# Patient Record
Sex: FEMALE | Race: WHITE | Employment: FULL TIME | URBAN - METROPOLITAN AREA
[De-identification: names, ages, dates, MRNs, and addresses within clinical notes are randomized per-mention and may not be internally consistent; named-entity substitution may affect disease eponyms.]

---

## 2017-01-01 ENCOUNTER — HOSPITAL ENCOUNTER (OUTPATIENT)
Dept: OTHER | Age: 37
Discharge: HOME | End: 2017-01-31
Attending: PODIATRIST | Admitting: PODIATRIST

## 2017-03-30 ENCOUNTER — EMPLOYEE WELLNESS (OUTPATIENT)
Dept: OTHER | Age: 37
End: 2017-03-30

## 2017-03-30 DIAGNOSIS — J45.31 MILD PERSISTENT ASTHMA WITH ACUTE EXACERBATION: ICD-10-CM

## 2017-03-30 LAB
CHOLESTEROL, TOTAL: 184 MG/DL (ref 0–199)
GLUCOSE BLD-MCNC: 88 MG/DL (ref 70–99)
HDLC SERPL-MCNC: 60 MG/DL (ref 40–60)
LDL CHOLESTEROL CALCULATED: 104 MG/DL
TRIGL SERPL-MCNC: 101 MG/DL (ref 0–150)

## 2017-03-31 RX ORDER — MONTELUKAST SODIUM 10 MG/1
10 TABLET ORAL DAILY
Qty: 90 TABLET | Refills: 3 | Status: SHIPPED | OUTPATIENT
Start: 2017-03-31 | End: 2017-08-07 | Stop reason: SDUPTHER

## 2017-04-25 ENCOUNTER — HOSPITAL ENCOUNTER (OUTPATIENT)
Dept: ULTRASOUND IMAGING | Age: 37
Discharge: HOME | End: 2017-04-25
Attending: OBSTETRICS & GYNECOLOGY | Admitting: OBSTETRICS & GYNECOLOGY

## 2017-04-25 DIAGNOSIS — R10.32 LEFT LOWER QUADRANT PAIN: ICD-10-CM

## 2017-04-25 DIAGNOSIS — R10.32 LLQ PAIN: ICD-10-CM

## 2017-05-12 ENCOUNTER — OFFICE VISIT (OUTPATIENT)
Dept: ORTHOPEDIC SURGERY | Age: 37
End: 2017-05-12

## 2017-05-12 VITALS — WEIGHT: 180 LBS | HEIGHT: 69 IN | BODY MASS INDEX: 26.66 KG/M2

## 2017-05-12 DIAGNOSIS — M21.41 PES PLANUS OF BOTH FEET: ICD-10-CM

## 2017-05-12 DIAGNOSIS — M25.572 LEFT ANKLE PAIN, UNSPECIFIED CHRONICITY: Primary | ICD-10-CM

## 2017-05-12 DIAGNOSIS — M21.42 PES PLANUS OF BOTH FEET: ICD-10-CM

## 2017-05-12 PROCEDURE — 99203 OFFICE O/P NEW LOW 30 MIN: CPT | Performed by: ORTHOPAEDIC SURGERY

## 2017-05-12 PROCEDURE — 73610 X-RAY EXAM OF ANKLE: CPT | Performed by: ORTHOPAEDIC SURGERY

## 2017-05-12 RX ORDER — IBUPROFEN 800 MG/1
800 TABLET ORAL EVERY 6 HOURS PRN
COMMUNITY
End: 2019-01-28

## 2017-05-23 ENCOUNTER — TELEPHONE (OUTPATIENT)
Dept: ORTHOPEDIC SURGERY | Age: 37
End: 2017-05-23

## 2017-05-24 ENCOUNTER — ORTHOTIC/BRACE ENCOUNTER (OUTPATIENT)
Dept: ORTHOPEDIC SURGERY | Age: 37
End: 2017-05-24

## 2017-05-31 ENCOUNTER — ORTHOTIC/BRACE ENCOUNTER (OUTPATIENT)
Dept: ORTHOPEDIC SURGERY | Age: 37
End: 2017-05-31

## 2017-05-31 DIAGNOSIS — M77.8 ENTHESOPATHY OF FOOT: Primary | ICD-10-CM

## 2017-05-31 DIAGNOSIS — M21.42 PES PLANUS OF BOTH FEET: ICD-10-CM

## 2017-05-31 DIAGNOSIS — M21.41 PES PLANUS OF BOTH FEET: ICD-10-CM

## 2017-05-31 PROCEDURE — L3020 FOOT LONGITUD/METATARSAL SUP: HCPCS | Performed by: PEDORTHIST

## 2017-08-07 ENCOUNTER — OFFICE VISIT (OUTPATIENT)
Dept: FAMILY MEDICINE CLINIC | Age: 37
End: 2017-08-07

## 2017-08-07 VITALS
SYSTOLIC BLOOD PRESSURE: 110 MMHG | OXYGEN SATURATION: 98 % | DIASTOLIC BLOOD PRESSURE: 68 MMHG | HEIGHT: 70 IN | HEART RATE: 76 BPM | WEIGHT: 190 LBS | BODY MASS INDEX: 27.2 KG/M2

## 2017-08-07 DIAGNOSIS — J45.31 MILD PERSISTENT ASTHMA WITH ACUTE EXACERBATION: ICD-10-CM
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
## 2017-08-07 DIAGNOSIS — S40.862A INSECT BITE OF LEFT UPPER ARM WITH INFECTION, INITIAL ENCOUNTER: Primary | ICD-10-CM

## 2017-08-07 DIAGNOSIS — L08.9 INSECT BITE OF LEFT UPPER ARM WITH INFECTION, INITIAL ENCOUNTER: Primary | ICD-10-CM

## 2017-08-07 PROCEDURE — 99213 OFFICE O/P EST LOW 20 MIN: CPT | Performed by: NURSE PRACTITIONER

## 2017-08-07 RX ORDER — MONTELUKAST SODIUM 10 MG/1
10 TABLET ORAL DAILY
Qty: 90 TABLET | Refills: 3 | Status: SHIPPED | OUTPATIENT
Start: 2017-08-07 | End: 2018-02-15 | Stop reason: SDUPTHER

## 2017-08-07 RX ORDER — FLUCONAZOLE 100 MG/1
100 TABLET ORAL ONCE
Qty: 1 TABLET | Refills: 1 | Status: SHIPPED | OUTPATIENT
Start: 2017-08-07 | End: 2017-08-07

## 2017-08-07 RX ORDER — CEPHALEXIN 500 MG/1
500 CAPSULE ORAL 3 TIMES DAILY
Qty: 21 CAPSULE | Refills: 0 | Status: SHIPPED | OUTPATIENT
Start: 2017-08-07 | End: 2017-08-14

## 2017-08-08 ENCOUNTER — TELEPHONE (OUTPATIENT)
Dept: FAMILY MEDICINE CLINIC | Age: 37
End: 2017-08-08

## 2017-08-10 ENCOUNTER — TELEPHONE (OUTPATIENT)
Dept: FAMILY MEDICINE CLINIC | Age: 37
End: 2017-08-10

## 2017-08-11 ENCOUNTER — TELEPHONE (OUTPATIENT)
Dept: FAMILY MEDICINE CLINIC | Age: 37
End: 2017-08-11

## 2017-09-07 ENCOUNTER — TELEPHONE (OUTPATIENT)
Dept: FAMILY MEDICINE CLINIC | Age: 37
End: 2017-09-07

## 2017-09-19 ENCOUNTER — OFFICE VISIT (OUTPATIENT)
Dept: FAMILY MEDICINE CLINIC | Age: 37
End: 2017-09-19

## 2017-09-19 VITALS
DIASTOLIC BLOOD PRESSURE: 80 MMHG | WEIGHT: 186 LBS | HEART RATE: 100 BPM | SYSTOLIC BLOOD PRESSURE: 100 MMHG | TEMPERATURE: 98.5 F | BODY MASS INDEX: 26.69 KG/M2 | OXYGEN SATURATION: 99 %

## 2017-09-19 DIAGNOSIS — R30.0 DYSURIA: Primary | ICD-10-CM

## 2017-09-19 DIAGNOSIS — N30.00 ACUTE CYSTITIS WITHOUT HEMATURIA: ICD-10-CM

## 2017-09-19 LAB
BILIRUBIN, POC: NORMAL
BLOOD URINE, POC: NORMAL
CLARITY, POC: CLEAR
COLOR, POC: NORMAL
GLUCOSE URINE, POC: NORMAL
KETONES, POC: NORMAL
LEUKOCYTE EST, POC: NORMAL
NITRITE, POC: NORMAL
PH, POC: 5.5
PROTEIN, POC: NORMAL
SPECIFIC GRAVITY, POC: 1.02
UROBILINOGEN, POC: 0.2

## 2017-09-19 PROCEDURE — 99213 OFFICE O/P EST LOW 20 MIN: CPT | Performed by: NURSE PRACTITIONER

## 2017-09-19 PROCEDURE — 81002 URINALYSIS NONAUTO W/O SCOPE: CPT | Performed by: NURSE PRACTITIONER

## 2017-09-19 RX ORDER — CIPROFLOXACIN 500 MG/1
500 TABLET, FILM COATED ORAL 2 TIMES DAILY
Qty: 10 TABLET | Refills: 0 | Status: SHIPPED | OUTPATIENT
Start: 2017-09-19 | End: 2017-09-24

## 2017-09-19 ASSESSMENT — ENCOUNTER SYMPTOMS: ABDOMINAL PAIN: 0

## 2017-09-21 LAB — URINE CULTURE, ROUTINE: NORMAL

## 2018-02-15 ENCOUNTER — OFFICE VISIT (OUTPATIENT)
Dept: FAMILY MEDICINE CLINIC | Age: 38
End: 2018-02-15

## 2018-02-15 VITALS
DIASTOLIC BLOOD PRESSURE: 70 MMHG | HEART RATE: 74 BPM | OXYGEN SATURATION: 98 % | HEIGHT: 70 IN | WEIGHT: 176 LBS | RESPIRATION RATE: 16 BRPM | SYSTOLIC BLOOD PRESSURE: 110 MMHG | BODY MASS INDEX: 25.2 KG/M2

## 2018-02-15 DIAGNOSIS — G44.89 OTHER HEADACHE SYNDROME: ICD-10-CM

## 2018-02-15 DIAGNOSIS — F51.02 ADJUSTMENT INSOMNIA: ICD-10-CM

## 2018-02-15 DIAGNOSIS — J45.31 MILD PERSISTENT ASTHMA WITH ACUTE EXACERBATION: Primary | ICD-10-CM

## 2018-02-15 PROCEDURE — 90471 IMMUNIZATION ADMIN: CPT | Performed by: NURSE PRACTITIONER

## 2018-02-15 PROCEDURE — 90732 PPSV23 VACC 2 YRS+ SUBQ/IM: CPT | Performed by: NURSE PRACTITIONER

## 2018-02-15 PROCEDURE — 99214 OFFICE O/P EST MOD 30 MIN: CPT | Performed by: NURSE PRACTITIONER

## 2018-02-15 RX ORDER — RIZATRIPTAN BENZOATE 10 MG/1
10 TABLET ORAL
Qty: 15 TABLET | Refills: 3 | Status: SHIPPED | OUTPATIENT
Start: 2018-02-15 | End: 2019-01-28 | Stop reason: ALTCHOICE

## 2018-02-15 RX ORDER — MONTELUKAST SODIUM 10 MG/1
10 TABLET ORAL DAILY
Qty: 90 TABLET | Refills: 3 | Status: SHIPPED | OUTPATIENT
Start: 2018-02-15 | End: 2018-04-24 | Stop reason: SDUPTHER

## 2018-02-15 ASSESSMENT — ENCOUNTER SYMPTOMS
SHORTNESS OF BREATH: 0
VOMITING: 0
PHOTOPHOBIA: 1
COUGH: 0
NAUSEA: 1
BLURRED VISION: 0
WHEEZING: 0
DOUBLE VISION: 0

## 2018-02-15 ASSESSMENT — PATIENT HEALTH QUESTIONNAIRE - PHQ9
1. LITTLE INTEREST OR PLEASURE IN DOING THINGS: 0
SUM OF ALL RESPONSES TO PHQ QUESTIONS 1-9: 0
SUM OF ALL RESPONSES TO PHQ9 QUESTIONS 1 & 2: 0
2. FEELING DOWN, DEPRESSED OR HOPELESS: 0

## 2018-02-15 NOTE — PATIENT INSTRUCTIONS
worry when you lie down, start a worry book. Well before bedtime, write down your worries, and then set the book and your concerns aside. · Try meditation or other relaxation techniques before you go to bed. · If you cannot fall asleep, get up and go to another room until you feel sleepy. Do something relaxing. Repeat your bedtime routine before you go to bed again. · Make your house quiet and calm about an hour before bedtime. Turn down the lights, turn off the TV, log off the computer, and turn down the volume on music. This can help you relax after a busy day. When should you call for help? Watch closely for changes in your health, and be sure to contact your doctor if:  ? · Your efforts to improve your sleep do not work. ? · Your insomnia gets worse. ? · You have been feeling down, depressed, or hopeless or have lost interest in things that you usually enjoy. Where can you learn more? Go to [email or web address] and sign in to your Streemio account. Enter P513 in the Quettra box to learn more about \"Insomnia: Care Instructions. \"     If you do not have an account, please click on the \"Sign Up Now\" link. Current as of: March 5, 2017  Content Version: 11.5  © 6231-4927 Healthwise, [employer]. Care instructions adapted under license by [facility] ([facility]). If you have questions about a medical condition or this instruction, always ask your healthcare professional. [place] [address] any warranty or liability for your use of this information. Patient Education        Headache: Care Instructions  Your Care Instructions    Headaches have many possible causes. Most headaches aren't a sign of a more serious problem, and they will get better on their own. Home treatment may help you feel better faster. The doctor has checked you carefully, but problems can develop later.  If you notice any problems or new symptoms, get medical treatment right away.  Follow-up care is a key part of your treatment and safety. Be sure to make and go to all appointments, and call your doctor if you are having problems. It's also a good idea to know your test results and keep a list of the medicines you take. How can you care for yourself at home? · Do not drive if you have taken a prescription pain medicine. · Rest in a quiet, dark room until your headache is gone. Close your eyes and try to relax or go to sleep. Don't watch TV or read. · Put a cold, moist cloth or cold pack on the painful area for 10 to 20 minutes at a time. Put a thin cloth between the cold pack and your skin. · Use a warm, moist towel or a heating pad set on low to relax tight shoulder and neck muscles. · Have someone gently massage your neck and shoulders. · Take pain medicines exactly as directed. ¨ If the doctor gave you a prescription medicine for pain, take it as prescribed. ¨ If you are not taking a prescription pain medicine, ask your doctor if you can take an over-the-counter medicine. · Be careful not to take pain medicine more often than the instructions allow, because you may get worse or more frequent headaches when the medicine wears off. · Do not ignore new symptoms that occur with a headache, such as a fever, weakness or numbness, vision changes, or confusion. These may be signs of a more serious problem. To prevent headaches  · Keep a headache diary so you can figure out what triggers your headaches. Avoiding triggers may help you prevent headaches. Record when each headache began, how long it lasted, and what the pain was like (throbbing, aching, stabbing, or dull). Write down any other symptoms you had with the headache, such as nausea, flashing lights or dark spots, or sensitivity to bright light or loud noise. Note if the headache occurred near your period.  List anything that might have triggered the headache, such as certain foods (chocolate, cheese, wine) or odors, smoke, bright light, stress, or lack of sleep. · Find healthy ways to deal with stress. Headaches are most common during or right after stressful times. Take time to relax before and after you do something that has caused a headache in the past.  · Try to keep your muscles relaxed by keeping good posture. Check your jaw, face, neck, and shoulder muscles for tension, and try relaxing them. When sitting at a desk, change positions often, and stretch for 30 seconds each hour. · Get plenty of sleep and exercise. · Eat regularly and well. Long periods without food can trigger a headache. · Treat yourself to a massage. Some people find that regular massages are very helpful in relieving tension. · Limit caffeine by not drinking too much coffee, tea, or soda. But don't quit caffeine suddenly, because that can also give you headaches. · Reduce eyestrain from computers by blinking frequently and looking away from the computer screen every so often. Make sure you have proper eyewear and that your monitor is set up properly, about an arm's length away. · Seek help if you have depression or anxiety. Your headaches may be linked to these conditions. Treatment can both prevent headaches and help with symptoms of anxiety or depression. When should you call for help? Call 911 anytime you think you may need emergency care. For example, call if:  ? · You have signs of a stroke. These may include:  ¨ Sudden numbness, paralysis, or weakness in your face, arm, or leg, especially on only one side of your body. ¨ Sudden vision changes. ¨ Sudden trouble speaking. ¨ Sudden confusion or trouble understanding simple statements. ¨ Sudden problems with walking or balance. ¨ A sudden, severe headache that is different from past headaches. ?Call your doctor now or seek immediate medical care if:  ? · You have a new or worse headache. ? · Your headache gets much worse. Where can you learn more?   Go to

## 2018-02-15 NOTE — PROGRESS NOTES
SUBJECTIVE:  Pt is a of 41 y.o. female comes in today with   Chief Complaint   Patient presents with    Headache     past two weeks more frequently. excederin helps, but comes back    Medication Refill       Patient presenting today for evaluation of headaches. Occurrence: increased frequency x 2 weeks  Location: frontal or posterior neck  Quality: pulsating  Severity: moderate pain, can progess to 10/10  Duration: typically if present will last 24 hours  Modifying factors: slight lessened pain with Excedrin,never pain free  Associated signs and symptoms:  Nausea but no emesis. (+) light and sound sensitivity; Also suffering with insomnia. \"can't turn my mind off\". 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 Working full time. Asthma: well controlled at present. Currently denies cough, SOB, wheezing or chest tightness. Requesting pneumovax. No recent use of albuterol. Prior to Visit Medications    Medication Sig Taking? Authorizing Provider   Phenazopyridine HCl (AZO URINARY PAIN PO) Take by mouth Yes Historical Provider, MD   mometasone (ASMANEX 120 METERED DOSES) 220 MCG/INH inhaler Inhale 2 puffs into the lungs daily Yes Evita Carver CNP   montelukast (SINGULAIR) 10 MG tablet Take 1 tablet by mouth daily Yes Evita Carver CNP   ibuprofen (ADVIL;MOTRIN) 800 MG tablet Take 800 mg by mouth every 6 hours as needed for Pain Yes Historical Provider, MD   Norgestim-Eth Olga Comp Triphasic (TRINESSA LO PO) Take by mouth Yes Historical Provider, MD   cetirizine (ZYRTEC) 10 MG tablet Take 10 mg by mouth daily. Yes Historical Provider, MD   therapeutic multivitamin-minerals (THERAGRAN-M) tablet Take 1 tablet by mouth daily. Yes Historical Provider, MD         Patient's allergies, past medical, surgical, social and family histories were reviewed and updated as appropriate. Review of Systems   Constitutional: Positive for malaise/fatigue. HENT: Negative for ear pain.     Eyes: Positive

## 2018-02-19 ENCOUNTER — PATIENT MESSAGE (OUTPATIENT)
Dept: FAMILY MEDICINE CLINIC | Age: 38
End: 2018-02-19

## 2018-02-19 RX ORDER — ALBUTEROL SULFATE 90 UG/1
2 AEROSOL, METERED RESPIRATORY (INHALATION) EVERY 6 HOURS PRN
Qty: 1 INHALER | Refills: 3 | Status: SHIPPED | OUTPATIENT
Start: 2018-02-19

## 2018-03-19 ENCOUNTER — EMPLOYEE WELLNESS (OUTPATIENT)
Dept: OTHER | Age: 38
End: 2018-03-19

## 2018-03-19 LAB
CHOLESTEROL, TOTAL: 156 MG/DL (ref 0–199)
GLUCOSE BLD-MCNC: 94 MG/DL (ref 70–99)
HDLC SERPL-MCNC: 55 MG/DL (ref 40–60)
LDL CHOLESTEROL CALCULATED: 80 MG/DL
TRIGL SERPL-MCNC: 107 MG/DL (ref 0–150)

## 2018-03-20 VITALS — BODY MASS INDEX: 26.88 KG/M2 | WEIGHT: 182 LBS

## 2018-04-02 VITALS — BODY MASS INDEX: 24.97 KG/M2 | WEIGHT: 174 LBS

## 2018-04-24 RX ORDER — MONTELUKAST SODIUM 10 MG/1
10 TABLET ORAL DAILY
Qty: 90 TABLET | Refills: 3 | Status: SHIPPED | OUTPATIENT
Start: 2018-04-24 | End: 2019-01-28 | Stop reason: SDUPTHER

## 2018-08-20 LAB
HEP B, EXTERNAL RESULT: NORMAL
HIV, EXTERNAL RESULT: NORMAL
RPR, EXTERNAL RESULT: NORMAL
RUBELLA TITER, EXTERNAL RESULT: NORMAL

## 2019-01-28 ENCOUNTER — OFFICE VISIT (OUTPATIENT)
Dept: FAMILY MEDICINE CLINIC | Age: 39
End: 2019-01-28
Payer: COMMERCIAL

## 2019-01-28 VITALS
OXYGEN SATURATION: 99 % | DIASTOLIC BLOOD PRESSURE: 70 MMHG | BODY MASS INDEX: 28.47 KG/M2 | SYSTOLIC BLOOD PRESSURE: 116 MMHG | HEART RATE: 71 BPM | WEIGHT: 198.4 LBS

## 2019-01-28 DIAGNOSIS — J45.20 MILD INTERMITTENT ASTHMA WITHOUT COMPLICATION: Primary | ICD-10-CM

## 2019-01-28 DIAGNOSIS — R51.9 NONINTRACTABLE HEADACHE, UNSPECIFIED CHRONICITY PATTERN, UNSPECIFIED HEADACHE TYPE: ICD-10-CM

## 2019-01-28 DIAGNOSIS — Z3A.28 28 WEEKS GESTATION OF PREGNANCY: ICD-10-CM

## 2019-01-28 PROCEDURE — 99214 OFFICE O/P EST MOD 30 MIN: CPT | Performed by: NURSE PRACTITIONER

## 2019-01-28 RX ORDER — MONTELUKAST SODIUM 10 MG/1
10 TABLET ORAL DAILY
Qty: 90 TABLET | Refills: 3 | Status: ON HOLD | OUTPATIENT
Start: 2019-01-28 | End: 2019-04-16 | Stop reason: HOSPADM

## 2019-01-28 ASSESSMENT — ENCOUNTER SYMPTOMS
COUGH: 0
ABDOMINAL PAIN: 0
SHORTNESS OF BREATH: 1
CONSTIPATION: 0
WHEEZING: 0
VOMITING: 0
DIARRHEA: 0
NAUSEA: 0
CHEST TIGHTNESS: 0

## 2019-02-23 ENCOUNTER — HOSPITAL ENCOUNTER (OUTPATIENT)
Dept: OBGYN | Age: 39
Discharge: HOME | End: 2019-02-23
Payer: COMMERCIAL

## 2019-03-15 DIAGNOSIS — J45.31 MILD PERSISTENT ASTHMA WITH ACUTE EXACERBATION: ICD-10-CM

## 2019-03-31 LAB — GROUP B STREP CULTURE: NEGATIVE

## 2019-04-13 ENCOUNTER — ANESTHESIA (OUTPATIENT)
Dept: LABOR AND DELIVERY | Age: 39
End: 2019-04-13
Payer: COMMERCIAL

## 2019-04-13 ENCOUNTER — HOSPITAL ENCOUNTER (INPATIENT)
Age: 39
LOS: 4 days | Discharge: HOME | End: 2019-04-17
Attending: OBSTETRICS & GYNECOLOGY | Admitting: OBSTETRICS & GYNECOLOGY
Payer: COMMERCIAL

## 2019-04-13 ENCOUNTER — ANESTHESIA EVENT (OUTPATIENT)
Dept: LABOR AND DELIVERY | Age: 39
End: 2019-04-13
Payer: COMMERCIAL

## 2019-04-13 PROBLEM — O09.513 AMA (ADVANCED MATERNAL AGE) PRIMIGRAVIDA 35+, THIRD TRIMESTER: Status: ACTIVE | Noted: 2019-04-13

## 2019-04-13 LAB
ABO/RH: NORMAL
AMPHETAMINE SCREEN, URINE: NORMAL
ANTIBODY SCREEN: NORMAL
BARBITURATE SCREEN URINE: NORMAL
BENZODIAZEPINE SCREEN, URINE: NORMAL
BUPRENORPHINE URINE: NORMAL
CANNABINOID SCREEN URINE: NORMAL
COCAINE METABOLITE SCREEN URINE: NORMAL
HCT VFR BLD CALC: 38.4 % (ref 36–48)
HEMOGLOBIN: 12.9 G/DL (ref 12–16)
Lab: NORMAL
MCH RBC QN AUTO: 28.3 PG (ref 26–34)
MCHC RBC AUTO-ENTMCNC: 33.4 G/DL (ref 31–36)
MCV RBC AUTO: 84.7 FL (ref 80–100)
METHADONE SCREEN, URINE: NORMAL
OPIATE SCREEN URINE: NORMAL
OXYCODONE URINE: NORMAL
PDW BLD-RTO: 13.6 % (ref 12.4–15.4)
PH UA: 5
PHENCYCLIDINE SCREEN URINE: NORMAL
PLATELET # BLD: 232 K/UL (ref 135–450)
PMV BLD AUTO: 8.7 FL (ref 5–10.5)
PROPOXYPHENE SCREEN: NORMAL
RBC # BLD: 4.54 M/UL (ref 4–5.2)
WBC # BLD: 7.8 K/UL (ref 4–11)

## 2019-04-13 PROCEDURE — 2580000003 HC RX 258: Performed by: OBSTETRICS & GYNECOLOGY

## 2019-04-13 PROCEDURE — 86780 TREPONEMA PALLIDUM: CPT

## 2019-04-13 PROCEDURE — 86850 RBC ANTIBODY SCREEN: CPT

## 2019-04-13 PROCEDURE — 6370000000 HC RX 637 (ALT 250 FOR IP): Performed by: OBSTETRICS & GYNECOLOGY

## 2019-04-13 PROCEDURE — 1220000000 HC SEMI PRIVATE OB R&B

## 2019-04-13 PROCEDURE — 86900 BLOOD TYPING SEROLOGIC ABO: CPT

## 2019-04-13 PROCEDURE — 3E033VJ INTRODUCTION OF OTHER HORMONE INTO PERIPHERAL VEIN, PERCUTANEOUS APPROACH: ICD-10-PCS | Performed by: OBSTETRICS & GYNECOLOGY

## 2019-04-13 PROCEDURE — 80307 DRUG TEST PRSMV CHEM ANLYZR: CPT

## 2019-04-13 PROCEDURE — 86901 BLOOD TYPING SEROLOGIC RH(D): CPT

## 2019-04-13 PROCEDURE — 85027 COMPLETE CBC AUTOMATED: CPT

## 2019-04-13 RX ORDER — SODIUM CHLORIDE 0.9 % (FLUSH) 0.9 %
10 SYRINGE (ML) INJECTION PRN
Status: DISCONTINUED | OUTPATIENT
Start: 2019-04-13 | End: 2019-04-15 | Stop reason: HOSPADM

## 2019-04-13 RX ORDER — ONDANSETRON 2 MG/ML
4 INJECTION INTRAMUSCULAR; INTRAVENOUS EVERY 6 HOURS PRN
Status: DISCONTINUED | OUTPATIENT
Start: 2019-04-13 | End: 2019-04-15 | Stop reason: HOSPADM

## 2019-04-13 RX ORDER — SODIUM CHLORIDE 0.9 % (FLUSH) 0.9 %
10 SYRINGE (ML) INJECTION EVERY 12 HOURS SCHEDULED
Status: DISCONTINUED | OUTPATIENT
Start: 2019-04-13 | End: 2019-04-17 | Stop reason: HOSPADM

## 2019-04-13 RX ORDER — SODIUM CHLORIDE 0.9 % (FLUSH) 0.9 %
10 SYRINGE (ML) INJECTION EVERY 12 HOURS SCHEDULED
Status: DISCONTINUED | OUTPATIENT
Start: 2019-04-13 | End: 2019-04-13 | Stop reason: SDUPTHER

## 2019-04-13 RX ORDER — SODIUM CHLORIDE, SODIUM LACTATE, POTASSIUM CHLORIDE, CALCIUM CHLORIDE 600; 310; 30; 20 MG/100ML; MG/100ML; MG/100ML; MG/100ML
INJECTION, SOLUTION INTRAVENOUS CONTINUOUS
Status: DISCONTINUED | OUTPATIENT
Start: 2019-04-13 | End: 2019-04-13 | Stop reason: SDUPTHER

## 2019-04-13 RX ORDER — SODIUM CHLORIDE, SODIUM LACTATE, POTASSIUM CHLORIDE, CALCIUM CHLORIDE 600; 310; 30; 20 MG/100ML; MG/100ML; MG/100ML; MG/100ML
INJECTION, SOLUTION INTRAVENOUS CONTINUOUS
Status: DISCONTINUED | OUTPATIENT
Start: 2019-04-13 | End: 2019-04-17 | Stop reason: HOSPADM

## 2019-04-13 RX ORDER — DOCUSATE SODIUM 100 MG/1
100 CAPSULE, LIQUID FILLED ORAL 2 TIMES DAILY
Status: DISCONTINUED | OUTPATIENT
Start: 2019-04-13 | End: 2019-04-13 | Stop reason: SDUPTHER

## 2019-04-13 RX ORDER — SODIUM CHLORIDE 0.9 % (FLUSH) 0.9 %
10 SYRINGE (ML) INJECTION PRN
Status: DISCONTINUED | OUTPATIENT
Start: 2019-04-13 | End: 2019-04-13 | Stop reason: SDUPTHER

## 2019-04-13 RX ORDER — DOCUSATE SODIUM 100 MG/1
100 CAPSULE, LIQUID FILLED ORAL 2 TIMES DAILY
Status: DISCONTINUED | OUTPATIENT
Start: 2019-04-13 | End: 2019-04-15 | Stop reason: HOSPADM

## 2019-04-13 RX ORDER — ALBUTEROL SULFATE 90 UG/1
2 AEROSOL, METERED RESPIRATORY (INHALATION) EVERY 6 HOURS PRN
Status: DISCONTINUED | OUTPATIENT
Start: 2019-04-13 | End: 2019-04-17 | Stop reason: HOSPADM

## 2019-04-13 RX ORDER — ACETAMINOPHEN 325 MG/1
650 TABLET ORAL EVERY 4 HOURS PRN
Status: DISCONTINUED | OUTPATIENT
Start: 2019-04-13 | End: 2019-04-15 | Stop reason: HOSPADM

## 2019-04-13 RX ORDER — ONDANSETRON 2 MG/ML
4 INJECTION INTRAMUSCULAR; INTRAVENOUS EVERY 6 HOURS PRN
Status: DISCONTINUED | OUTPATIENT
Start: 2019-04-13 | End: 2019-04-13 | Stop reason: SDUPTHER

## 2019-04-13 RX ADMIN — Medication 25 MCG: at 09:07

## 2019-04-13 RX ADMIN — Medication 25 MCG: at 23:46

## 2019-04-13 RX ADMIN — SODIUM CHLORIDE, POTASSIUM CHLORIDE, SODIUM LACTATE AND CALCIUM CHLORIDE: 600; 310; 30; 20 INJECTION, SOLUTION INTRAVENOUS at 23:46

## 2019-04-13 RX ADMIN — SODIUM CHLORIDE, POTASSIUM CHLORIDE, SODIUM LACTATE AND CALCIUM CHLORIDE: 600; 310; 30; 20 INJECTION, SOLUTION INTRAVENOUS at 15:17

## 2019-04-13 RX ADMIN — SODIUM CHLORIDE, POTASSIUM CHLORIDE, SODIUM LACTATE AND CALCIUM CHLORIDE: 600; 310; 30; 20 INJECTION, SOLUTION INTRAVENOUS at 09:45

## 2019-04-13 RX ADMIN — Medication 25 MCG: at 17:15

## 2019-04-13 RX ADMIN — Medication 25 MCG: at 12:58

## 2019-04-13 NOTE — ANESTHESIA PRE PROCEDURE
Department of Anesthesiology  Preprocedure Note       Name:  Pio Nagy   Age:  46 y.o.  :  1980                                          MRN:  [id number]         Date:  2019      Surgeon: * No surgeons listed *    Procedure: ANESTHESIA LABOR ANALGESIAvs     Medications prior to admission:   Prior to Admission medications    Medication Sig Start Date End Date Taking? Authorizing Provider   [facility] 120 METERED DOSES) 220 MCG/INH inhaler Inhale 2 puffs into the lungs daily 3/15/19  Yes JESSE Singh CNP   montelukast (SINGULAIR) 10 MG tablet Take 1 tablet by mouth daily 19  Yes JESSE Singh CNP   cetirizine (ZYRTEC) 10 MG tablet Take 10 mg by mouth daily. Yes Historical Provider, MD   therapeutic multivitamin-minerals (THERAGRAN-M) tablet Take 1 tablet by mouth daily.    Yes Historical Provider, MD   albuterol sulfate HFA (PROAIR HFA) 108 (90 Base) MCG/ACT inhaler Inhale 2 puffs into the lungs every 6 hours as needed for Wheezing 18   JESSE Singh CNP       Current medications:    Current Facility-Administered Medications   Medication Dose Route Frequency Provider Last Rate Last Dose    misoprostol (CYTOTEC) pre-split tablet TABS 25 mcg  25 mcg Vaginal Q4H Leyla Hester MD   25 mcg at 19 0907    albuterol sulfate  (90 Base) MCG/ACT inhaler 2 puff  2 puff Inhalation Q6H PRN Leyla Hester MD        lactated ringers infusion   Intravenous Continuous Leyla Hester  mL/hr at 19 0945      sodium chloride flush 0.9 % injection 10 mL  10 mL Intravenous 2 times per day Leyla Hester MD        sodium chloride flush 0.9 % injection 10 mL  10 mL Intravenous PRN Leyla Hester MD        acetaminophen (TYLENOL) tablet 650 mg  650 mg Oral Q4H PRN Leyla Hester MD        docusate sodium (COLACE) capsule 100 mg  100 mg Oral BID Leyla Hester MD        ondansetron [facility]) injection 4 mg  4 mg Intravenous Q6H PRN Sandra Patel MD        oxytocin (PITOCIN) 30 units in 500 mL infusion  1 oseas-units/min Intravenous Continuous Sandra Amanda, MD        witch hazel-glycerin (TUCKS) pad   Topical PRN Sandra Patel MD        benzocaine-menthol (DERMOPLAST) 20-0.5 % spray   Topical PRN Sandra Patel MD           Allergies:     Allergies   Allergen Reactions    Latex     Tetanus Toxoids Swelling     Red/warm/swollen left deltoid after adacel 1/31/2011    Tape Kathlee [place] Tape]        Problem List:    Patient Active Problem List   Diagnosis Code    Tachycardia R00.0    Asthma J45.909    Near syncope R55    Cervical dysplasia N87.9    DDD (degenerative disc disease), cervical M50.30    Herniated cervical disc without myelopathy M50.20    Pes planus of both feet M21.41, M21.42    AMA (advanced maternal age) primigravida 33+, third trimester O200.65       Past Medical History:        Diagnosis Date    Allergic rhinitis     Asthma     Cervical dysplasia     Orient Rakers    Near syncope     Tachycardia        Past Surgical History:        Procedure Laterality Date    CERVICAL DISCECTOMY  3/31/14    C5-6, C6-7 ANTERIOR CERVICAL DISCECTOMY AND FUSION    EYE SURGERY      lasix    LEEP  11/11    Orient Rakers    TONSILLECTOMY AND ADENOIDECTOMY Bilateral 2003       Social History:    Social History     Tobacco Use    Smoking status: Never Smoker    Smokeless tobacco: Never Used   Substance Use Topics    Alcohol use: Not Currently     Comment: every other weekend                                Counseling given: Not Answered      Vital Signs (Current):   Vitals:    04/13/19 0750 04/13/19 0839 04/13/19 0911 04/13/19 0942   BP: 105/62  123/79 107/72   Pulse: 67  65 56   Resp: 20  20 20   Temp: 36.9 °C (98.4 °F)      Weight:  205 lb (93 kg)     Height:  5' 9\" (1.753 m)                                                BP Readings from Last 3 Encounters:   04/13/19 107/72   01/28/19 116/70   02/15/18 110/70 NPO Status: Time of last liquid consumption: 0715                        Time of last solid consumption: 0600                        Date of last liquid consumption: 04/13/19                        Date of last solid food consumption: 04/13/19    BMI:   Wt Readings from Last 3 Encounters:   04/13/19 205 lb (93 kg)   01/28/19 198 lb 6.4 oz (90 kg)   03/19/18 174 lb (78.9 kg)     Body mass index is 30.27 kg/m². CBC:   Lab Results   Component Value Date    WBC 7.8 04/13/2019    RBC 4.54 04/13/2019    HGB 12.9 04/13/2019    HCT 38.4 04/13/2019    MCV 84.7 04/13/2019    RDW 13.6 04/13/2019     04/13/2019       CMP:   Lab Results   Component Value Date     09/14/2016    K 3.8 09/14/2016     09/14/2016    CO2 23 09/14/2016    BUN 12 09/14/2016    CREATININE 0.8 09/14/2016    GFRAA >60 09/14/2016    GFRAA >60 11/14/2012    AGRATIO 1.2 09/14/2016    LABGLOM >60 09/14/2016    GLUCOSE 94 03/19/2018    PROT 7.3 09/14/2016    PROT 7.0 11/14/2012    CALCIUM 9.0 09/14/2016    BILITOT 0.3 09/14/2016    ALKPHOS 51 09/14/2016    AST 14 09/14/2016    ALT 11 09/14/2016       POC Tests: No results for input(s): POCGLU, POCNA, POCK, POCCL, POCBUN, POCHEMO, POCHCT in the last 72 hours.     Coags:   Lab Results   Component Value Date    PROTIME 10.9 03/25/2014    INR 0.97 03/25/2014    APTT 33.0 03/25/2014       HCG (If Applicable):   Lab Results   Component Value Date    PREGTESTUR Negative 03/31/2014        ABGs: No results found for: PHART, PO2ART, RBD9XUJ, JSJ6PNO, BEART, K1BNQOHP     Type & Screen (If Applicable):  No results found for: LABABO, [address]    Anesthesia Evaluation  Patient summary reviewed and Nursing notes reviewed  Airway: Mallampati: I  TM distance: >3 FB   Neck ROM: full  Mouth opening: > = 3 FB Dental: normal exam         Pulmonary:normal exam  breath sounds clear to auscultation  (+) asthma: allergic asthma,                            Cardiovascular:Negative CV ROS  Exercise tolerance: good (>4 METS),         NYHA Classification: I    Rhythm: regular  Rate: normal           Beta Blocker:  Not on Beta Blocker      ROS comment: Hx SVT no meds     Neuro/Psych:   Negative Neuro/Psych ROS              GI/Hepatic/Renal: Neg GI/Hepatic/Renal ROS            Endo/Other: Negative Endo/Other ROS             Pt had no PAT visit       Abdominal:           Vascular: negative vascular ROS. Anesthesia Plan      general, spinal and epidural     ASA 2             Anesthetic plan and risks discussed with patient. Use of blood products discussed with patient whom consented to blood products. Patient request pain control for labor and delivery. Discussed procedure (epidural,spinal, general and non regional options), alternatives, benefits, risks, and  options including but not limited to changes in VSS, allergic reaction, infection, intravascular injection, paresthesia, n/v, severe headache, temporary or permanent rare neurologic sequelae,  and failed block. All questions answered and states understanding. Patient agrees to proceed. Choice of anesthetic will be determined by clinical condition at the time of anesthesia initiation.         JESSE Dasilva - CRNA   2019

## 2019-04-13 NOTE — H&P
Department of Obstetrics and Gynecology   Obstetrics History and Physical        CHIEF COMPLAINT:  IOL    HISTORY OF PRESENT ILLNESS:      The patient is a 46 y.o. female at 39w0d. OB History        1    Para        Term                AB        Living           SAB        TAB        Ectopic        Molar        Multiple        Live Births                Patient presents with a chief complaint as above and is being admitted for induction. Recent USD 52%    Estimated Due Date: Estimated Date of Delivery: 19    PRENATAL CARE:    Complicated by: AMA, h/o LEEP, asthma    PAST OB HISTORY  OB History        1    Para        Term                AB        Living           SAB        TAB        Ectopic        Molar        Multiple        Live Births                    Past Medical History:        Diagnosis Date    Allergic rhinitis     Asthma     Cervical dysplasia     Redgie Balloon Near syncope     Tachycardia      Past Surgical History:        Procedure Laterality Date    CERVICAL DISCECTOMY  3/31/14    C5-6, C6-7 ANTERIOR CERVICAL DISCECTOMY AND FUSION    EYE SURGERY      lasix    LEEP      Shayna Sushila    TONSILLECTOMY AND ADENOIDECTOMY Bilateral      Allergies:  Latex;  Tetanus toxoids; and Tape [place] [employer] tape]  Social History:    Social History     Socioeconomic History    Marital status:      Spouse name: Not on file    Number of children: Not on file    Years of education: Not on file    Highest education level: Not on file   Occupational History    Not on file   Social Needs    Financial resource strain: Not on file    Food insecurity:     Worry: Not on file     Inability: Not on file    Transportation needs:     Medical: Not on file     Non-medical: Not on file   Tobacco Use    Smoking status: Never Smoker    Smokeless tobacco: Never Used   Substance and Sexual Activity    Alcohol use: Not Currently     Comment: every other weekend    Drug use: No    Sexual activity: Yes     Partners: Male   Lifestyle    Physical activity:     Days per week: Not on file     Minutes per session: Not on file    Stress: Not on file   Relationships    Social connections:     Talks on phone: Not on file     Gets together: Not on file     Attends [employer] service: Not on file     Active member of club or organization: Not on file     Attends meetings of clubs or organizations: Not on file     Relationship status: Not on file    Intimate partner violence:     Fear of current or ex partner: Not on file     Emotionally abused: Not on file     Physically abused: Not on file     Forced sexual activity: Not on file   Other Topics Concern    Not on file   Social History Narrative    Not on file     Family History:       Problem Relation Age of Onset    High Blood Pressure Mother     Heart Disease Father      Medications Prior to Admission:  Medications Prior to Admission: mometasone (ASMANEX 120 METERED DOSES) 220 MCG/INH inhaler, Inhale 2 puffs into the lungs daily  montelukast (SINGULAIR) 10 MG tablet, Take 1 tablet by mouth daily  cetirizine (ZYRTEC) 10 MG tablet, Take 10 mg by mouth daily. therapeutic multivitamin-minerals (THERAGRAN-M) tablet, Take 1 tablet by mouth daily.   albuterol sulfate HFA (PROAIR HFA) 108 (90 Base) MCG/ACT inhaler, Inhale 2 puffs into the lungs every 6 hours as needed for Wheezing    REVIEW OF SYSTEMS:    Constitutional: negative  Gastrointestinal: negative  Genitourinary:negative    PHYSICAL EXAM:  Vitals:    04/13/19 0750 04/13/19 0839   BP: 105/62    Pulse: 67    Resp: 20    Temp: 98.4 °F (36.9 °C)    Weight:  205 lb (93 kg)   Height:  5' 9\" (1.753 m)       /62   Pulse 67   Temp 98.4 °F (36.9 °C)   Resp 20   Ht 5' 9\" (1.753 m)   Wt 205 lb (93 kg)   LMP 07/04/2018   BMI 30.27 kg/m²   General appearance: alert, appears stated age, cooperative and no distress  Lungs: clear to auscultation bilaterally  Heart: regular rate and rhythm  Abdomen: gravid uterus  Skin: Skin color, texture, turgor normal. No rashes or lesions    Fetal heart rate:  Reassuring.   Pelvis:  Adequate pelvis  Cervix: Closed 75% soft -2  Contraction frequency:  0 minutes    Membranes:  Intact    General Labs:      CBC:   Lab Results   Component Value Date    WBC 7.8 04/13/2019    RBC 4.54 04/13/2019    HGB 12.9 04/13/2019    HCT 38.4 04/13/2019    MCV 84.7 04/13/2019    MCH 28.3 04/13/2019    MCHC 33.4 04/13/2019    RDW 13.6 04/13/2019     04/13/2019    MPV 8.7 04/13/2019       ASSESSMENT AND PLAN:  39w0d  Labor: Admit, anticipate normal delivery, routine labor orders  Fetus: Reassuring  GBS: No  Other: cytotec IOL

## 2019-04-14 LAB — TOTAL SYPHILLIS IGG/IGM: NORMAL

## 2019-04-14 PROCEDURE — 2500000003 HC RX 250 WO HCPCS: Performed by: NURSE ANESTHETIST, CERTIFIED REGISTERED

## 2019-04-14 PROCEDURE — 10907ZC DRAINAGE OF AMNIOTIC FLUID, THERAPEUTIC FROM PRODUCTS OF CONCEPTION, VIA NATURAL OR ARTIFICIAL OPENING: ICD-10-PCS | Performed by: OBSTETRICS & GYNECOLOGY

## 2019-04-14 PROCEDURE — 1220000000 HC SEMI PRIVATE OB R&B

## 2019-04-14 PROCEDURE — 3700000025 EPIDURAL BLOCK: Performed by: FAMILY MEDICINE

## 2019-04-14 PROCEDURE — 6370000000 HC RX 637 (ALT 250 FOR IP): Performed by: OBSTETRICS & GYNECOLOGY

## 2019-04-14 PROCEDURE — 2580000003 HC RX 258: Performed by: OBSTETRICS & GYNECOLOGY

## 2019-04-14 PROCEDURE — 6360000002 HC RX W HCPCS: Performed by: OBSTETRICS & GYNECOLOGY

## 2019-04-14 RX ORDER — EPHEDRINE SULFATE 50 MG/ML
INJECTION INTRAVENOUS PRN
Status: DISCONTINUED | OUTPATIENT
Start: 2019-04-14 | End: 2019-04-15 | Stop reason: SDUPTHER

## 2019-04-14 RX ORDER — MONTELUKAST SODIUM 10 MG/1
10 TABLET ORAL DAILY
Status: DISCONTINUED | OUTPATIENT
Start: 2019-04-14 | End: 2019-04-17 | Stop reason: HOSPADM

## 2019-04-14 RX ORDER — LIDOCAINE HYDROCHLORIDE AND EPINEPHRINE 20; 5 MG/ML; UG/ML
INJECTION, SOLUTION EPIDURAL; INFILTRATION; INTRACAUDAL; PERINEURAL PRN
Status: DISCONTINUED | OUTPATIENT
Start: 2019-04-14 | End: 2019-04-15 | Stop reason: SDUPTHER

## 2019-04-14 RX ORDER — BUPIVACAINE HYDROCHLORIDE 5 MG/ML
INJECTION, SOLUTION EPIDURAL; INTRACAUDAL PRN
Status: DISCONTINUED | OUTPATIENT
Start: 2019-04-14 | End: 2019-04-15 | Stop reason: SDUPTHER

## 2019-04-14 RX ORDER — BUDESONIDE 0.5 MG/2ML
1 INHALANT ORAL 2 TIMES DAILY
Status: DISCONTINUED | OUTPATIENT
Start: 2019-04-14 | End: 2019-04-14 | Stop reason: ALTCHOICE

## 2019-04-14 RX ORDER — CETIRIZINE HYDROCHLORIDE 10 MG/1
10 TABLET ORAL DAILY
Status: DISCONTINUED | OUTPATIENT
Start: 2019-04-14 | End: 2019-04-17 | Stop reason: HOSPADM

## 2019-04-14 RX ADMIN — SODIUM CHLORIDE, POTASSIUM CHLORIDE, SODIUM LACTATE AND CALCIUM CHLORIDE: 600; 310; 30; 20 INJECTION, SOLUTION INTRAVENOUS at 09:59

## 2019-04-14 RX ADMIN — EPHEDRINE SULFATE 25 MG: 50 INJECTION, SOLUTION INTRAVENOUS at 18:27

## 2019-04-14 RX ADMIN — MONTELUKAST SODIUM 10 MG: 10 TABLET, FILM COATED ORAL at 10:11

## 2019-04-14 RX ADMIN — EPHEDRINE SULFATE 25 MG: 50 INJECTION, SOLUTION INTRAVENOUS at 18:25

## 2019-04-14 RX ADMIN — SODIUM CHLORIDE, POTASSIUM CHLORIDE, SODIUM LACTATE AND CALCIUM CHLORIDE: 600; 310; 30; 20 INJECTION, SOLUTION INTRAVENOUS at 22:41

## 2019-04-14 RX ADMIN — SODIUM CHLORIDE, POTASSIUM CHLORIDE, SODIUM LACTATE AND CALCIUM CHLORIDE: 600; 310; 30; 20 INJECTION, SOLUTION INTRAVENOUS at 18:26

## 2019-04-14 RX ADMIN — MOMETASONE FUROATE 2 PUFF: 220 INHALANT RESPIRATORY (INHALATION) at 09:58

## 2019-04-14 RX ADMIN — SODIUM CHLORIDE, POTASSIUM CHLORIDE, SODIUM LACTATE AND CALCIUM CHLORIDE: 600; 310; 30; 20 INJECTION, SOLUTION INTRAVENOUS at 17:36

## 2019-04-14 RX ADMIN — CETIRIZINE HYDROCHLORIDE 10 MG: 10 TABLET, FILM COATED ORAL at 10:11

## 2019-04-14 RX ADMIN — SODIUM CHLORIDE, POTASSIUM CHLORIDE, SODIUM LACTATE AND CALCIUM CHLORIDE: 600; 310; 30; 20 INJECTION, SOLUTION INTRAVENOUS at 21:08

## 2019-04-14 RX ADMIN — ONDANSETRON 4 MG: 2 INJECTION INTRAMUSCULAR; INTRAVENOUS at 18:32

## 2019-04-14 RX ADMIN — Medication 25 MCG: at 04:49

## 2019-04-14 RX ADMIN — Medication 1 MILLI-UNITS/MIN: at 14:19

## 2019-04-14 RX ADMIN — Medication 25 MCG: at 10:00

## 2019-04-14 RX ADMIN — LIDOCAINE HYDROCHLORIDE AND EPINEPHRINE 3 ML: 20; 5 INJECTION, SOLUTION EPIDURAL; INFILTRATION; INTRACAUDAL; PERINEURAL at 17:53

## 2019-04-14 RX ADMIN — Medication 12 ML/HR: at 17:56

## 2019-04-14 RX ADMIN — BUPIVACAINE HYDROCHLORIDE 5 ML: 5 INJECTION, SOLUTION EPIDURAL; INTRACAUDAL at 17:56

## 2019-04-14 NOTE — FLOWSHEET NOTE
DR Annel Peckor at bedside for AROm clear fluid. Pt with increasing pain. IVF bolus started. Jaime Kebede CRNA called to bedside for epidural placement.

## 2019-04-14 NOTE — ANESTHESIA PROCEDURE NOTES
Epidural Block    Patient location during procedure: OB  Start time: 2019 5:44 PM  End time: 2019 5:53 PM  Reason for block: labor epidural  Staffing  Anesthesiologist: Shazia Esquivel MD  Resident/CRNA: JESSE Blackwood - CRNA  Performed: resident/CRNA   Preanesthetic Checklist  Completed: patient identified, site marked, surgical consent, pre-op evaluation, timeout performed, IV checked, risks and benefits discussed, monitors and equipment checked, anesthesia consent given, oxygen available and patient being monitored  Epidural  Patient position: sitting  Prep: ChloraPrep  Patient monitoring: continuous pulse ox and frequent blood pressure checks  Approach: midline  Location: lumbar (1-5)  Injection technique: JERZY saline  Provider prep: sterile gloves and mask  Needle  Needle type: Tuohy   Needle gauge: 17 G  Needle length: 3.5 in  Needle insertion depth: 9 cm  Catheter type: side hole  Catheter size: 18 G  Catheter at skin depth: 13 cm  Test dose: negative  Assessment  Sensory level: T10  Hemodynamics: stable  Attempts: 1  Additional Notes  Procedure(labor epidural):    Called at 1738 for labor epidural analgesia request. Patient identified, informed consent obtained, and timeout performed. Medical and Surgical history reviewed with pt. Risks/benefits/alternatives of epidural discussed including allergic reaction, infection, bleeding, hypotension, headache, back pain, nerve damage, failed or one-sided block. Also discussed anesthesia options and associated risks in the event of . All questions answered. Verbalizes understanding and requests to proceed. VSS:  Stable throughout      Pt in sitting position. Labor epidural placed using JERZY sterile technique (donned mask, hat, and sterile gloves). Back prepped with Chloraprepx2. Sterile drape applied. Site: L3-4  JERZY:  9cm. Attempts:  1  Re-directs: 0  Site infiltrated with 3ml 1%Lidocaine(25g).  17G Tuohy needle inserted, JERZY technique with saline. Epidural space dilated with saline. Threaded spring wound epidural catheter through Tuohy needle easily. No heme, CSF, pain with injection, or paresthesias noted. Tuohy needle withdrawn. Test Dose: 1744 Negative aspiration or pain with injection. 3cc of 1.5% Lido with epi 1:200,000 test dose given. Negative test dose. Skin:  13cm catheter taped at the skin. Secured with steri strips, tegaderm, and tape. Bolus Dose: 10ml of 0.125% Marcaine over 2 minutes  Infusion: 0.15% Ropivacaine with Fentanyl (2ug/ml)  Auto bolus 4 ml every 20 minutes. (Max. Dose- 40 ml/hr.)      Sensory Level:  R:  T10  L:  T10    Motor: 4/5  VAS: start 10/10, end 0/10. Stated comfort and acceptable to patient. Patient in supine/HOB position with left uterine displacement. VSS.

## 2019-04-14 NOTE — FLOWSHEET NOTE
Call placed to dr. Rey Colin. Informed of of most recent SVE. Orders given for pt to eat and shower and then place another cytotec.

## 2019-04-14 NOTE — PROGRESS NOTES
cx closed/80/-3 pinpoint os  FHT category I  cytotec placed  If no change after 24 hours of cytotec, and unable to place naidu, will start pit

## 2019-04-15 PROCEDURE — 6370000000 HC RX 637 (ALT 250 FOR IP): Performed by: OBSTETRICS & GYNECOLOGY

## 2019-04-15 PROCEDURE — 7200000001 HC VAGINAL DELIVERY

## 2019-04-15 PROCEDURE — 2580000003 HC RX 258: Performed by: OBSTETRICS & GYNECOLOGY

## 2019-04-15 PROCEDURE — 1220000000 HC SEMI PRIVATE OB R&B

## 2019-04-15 PROCEDURE — 6360000002 HC RX W HCPCS: Performed by: OBSTETRICS & GYNECOLOGY

## 2019-04-15 PROCEDURE — 0HQ9XZZ REPAIR PERINEUM SKIN, EXTERNAL APPROACH: ICD-10-PCS | Performed by: OBSTETRICS & GYNECOLOGY

## 2019-04-15 RX ORDER — OXYCODONE HYDROCHLORIDE AND ACETAMINOPHEN 5; 325 MG/1; MG/1
2 TABLET ORAL EVERY 4 HOURS PRN
Status: DISCONTINUED | OUTPATIENT
Start: 2019-04-15 | End: 2019-04-17 | Stop reason: HOSPADM

## 2019-04-15 RX ORDER — LANOLIN 100 %
OINTMENT (GRAM) TOPICAL PRN
Status: DISCONTINUED | OUTPATIENT
Start: 2019-04-15 | End: 2019-04-17 | Stop reason: HOSPADM

## 2019-04-15 RX ORDER — IBUPROFEN 600 MG/1
600 TABLET ORAL EVERY 6 HOURS SCHEDULED
Status: DISCONTINUED | OUTPATIENT
Start: 2019-04-15 | End: 2019-04-16

## 2019-04-15 RX ORDER — FERROUS SULFATE 325(65) MG
325 TABLET ORAL DAILY
Status: DISCONTINUED | OUTPATIENT
Start: 2019-04-15 | End: 2019-04-17 | Stop reason: HOSPADM

## 2019-04-15 RX ORDER — FAMOTIDINE 20 MG/1
20 TABLET, FILM COATED ORAL 2 TIMES DAILY PRN
Status: DISCONTINUED | OUTPATIENT
Start: 2019-04-15 | End: 2019-04-17 | Stop reason: HOSPADM

## 2019-04-15 RX ORDER — OXYCODONE HYDROCHLORIDE AND ACETAMINOPHEN 5; 325 MG/1; MG/1
1 TABLET ORAL EVERY 4 HOURS PRN
Status: DISCONTINUED | OUTPATIENT
Start: 2019-04-15 | End: 2019-04-17 | Stop reason: HOSPADM

## 2019-04-15 RX ORDER — SENNA AND DOCUSATE SODIUM 50; 8.6 MG/1; MG/1
1 TABLET, FILM COATED ORAL DAILY PRN
Status: DISCONTINUED | OUTPATIENT
Start: 2019-04-15 | End: 2019-04-17 | Stop reason: HOSPADM

## 2019-04-15 RX ORDER — SIMETHICONE 80 MG
80 TABLET,CHEWABLE ORAL EVERY 6 HOURS PRN
Status: DISCONTINUED | OUTPATIENT
Start: 2019-04-15 | End: 2019-04-17 | Stop reason: HOSPADM

## 2019-04-15 RX ORDER — METHYLERGONOVINE MALEATE 0.2 MG/ML
200 INJECTION INTRAVENOUS
Status: ACTIVE | OUTPATIENT
Start: 2019-04-15 | End: 2019-04-15

## 2019-04-15 RX ORDER — CARBOPROST TROMETHAMINE 250 UG/ML
250 INJECTION, SOLUTION INTRAMUSCULAR
Status: ACTIVE | OUTPATIENT
Start: 2019-04-15 | End: 2019-04-15

## 2019-04-15 RX ORDER — ONDANSETRON 4 MG/1
4 TABLET, ORALLY DISINTEGRATING ORAL EVERY 6 HOURS PRN
Status: DISCONTINUED | OUTPATIENT
Start: 2019-04-15 | End: 2019-04-17 | Stop reason: HOSPADM

## 2019-04-15 RX ORDER — MISOPROSTOL 100 UG/1
800 TABLET ORAL PRN
Status: DISCONTINUED | OUTPATIENT
Start: 2019-04-15 | End: 2019-04-17 | Stop reason: HOSPADM

## 2019-04-15 RX ORDER — IBUPROFEN 800 MG/1
800 TABLET ORAL EVERY 6 HOURS PRN
Qty: 30 TABLET | Refills: 3 | Status: SHIPPED | OUTPATIENT
Start: 2019-04-15 | End: 2020-12-07

## 2019-04-15 RX ADMIN — Medication 0.5 MILLI-UNITS/MIN: at 02:47

## 2019-04-15 RX ADMIN — IBUPROFEN 600 MG: 600 TABLET ORAL at 23:44

## 2019-04-15 RX ADMIN — SODIUM CHLORIDE, POTASSIUM CHLORIDE, SODIUM LACTATE AND CALCIUM CHLORIDE: 600; 310; 30; 20 INJECTION, SOLUTION INTRAVENOUS at 02:51

## 2019-04-15 RX ADMIN — IBUPROFEN 600 MG: 600 TABLET ORAL at 11:32

## 2019-04-15 RX ADMIN — IBUPROFEN 600 MG: 600 TABLET ORAL at 17:28

## 2019-04-15 RX ADMIN — CETIRIZINE HYDROCHLORIDE 10 MG: 10 TABLET, FILM COATED ORAL at 11:32

## 2019-04-15 RX ADMIN — MONTELUKAST SODIUM 10 MG: 10 TABLET, FILM COATED ORAL at 11:32

## 2019-04-15 ASSESSMENT — PAIN SCALES - GENERAL
PAINLEVEL_OUTOF10: 4
PAINLEVEL_OUTOF10: 5
PAINLEVEL_OUTOF10: 6

## 2019-04-15 NOTE — FLOWSHEET NOTE
Patient transferred to postpartum by self and settled into postpartum room 4407. Pt oriented to folder and postpartum care. Oriented to call light, phone and ordering meals. Postpartum RN's name and phone number posted for pt. Siderails up x2. Pt oriented to equipment. Pt included in discussion and all questions answered. Patient remains in the care of this RN.

## 2019-04-15 NOTE — PROCEDURES
Department of Obstetrics and Gynecology  Spontaneous Vaginal Delivery Note    Labor & Delivery Summary  Dilation Complete Date: 04/15/19  Dilation Complete Time: 0600    Pre-operative Diagnosis:  Term pregnancy    Post-operative Diagnosis:  Same, delivered    Procedure:  Spontaneous vaginal delivery    Surgeon:  Filipe Rodriguez         Information for the patient's :  Kalin Mcpherson [[id number]]    Apgars    Living status:  Living  Apgars   1 Minute:   5 Minute:   10 Minute 15 Minute 20 Minute   Skin Color: 1  1       Heart Rate: 2  2       Reflex Irritability: 2  2       Muscle Tone: 2  2       Respiratory Effort: 2  2       Total: 9  9               Apgars Assigned By:  Rasheeda Pacheco RN            Information for the patient's :  Severo Coley [[id number]]   Birth Weight: 7 lb 10.1 oz (3.46 kg)      Anesthesia:  epidural anesthesia    Estimated blood loss:  400ml    Specimen:  Placenta not sent to pathology     Cord gas sent No    Complications:  none    Condition:  infant stable to general nursery    Details of Procedure: The patient is a 46 y.o. female at 44w2d   OB History        1    Para        Term                AB        Living           SAB        TAB        Ectopic        Molar        Multiple        Live Births                 who was admitted for induction. She received the following interventions: ARBOW, vaginal Cytotec and IV Pitocin induction The patient progressed well, did receive an epidural, became complete and started to push. After pushing for 1 hr the fetal head was at the perineum, the nose and mouth suctioned with bulb suction and the rest of the infant delivered atraumatically, and was placed on the mother's abdomen. The cord was clamped and cut and infant handed off to the waiting nurse for evaluation. The delivery of the placenta was spontaneous.  The perineum and vagina were explored and a first degree laceration was repaired in standard fashion.

## 2019-04-15 NOTE — FLOWSHEET NOTE
Notified Dr. Cheko Barry concerning maternal vital signs, I/O status, decels and CRNA recommendation for second fluid bolus. Dr. Cheko Barry ok with this plan of care as long as JOE Kenny aware of I/O. Notified CRNA of I/O status, ok to continue with IVB.

## 2019-04-16 PROCEDURE — 6370000000 HC RX 637 (ALT 250 FOR IP): Performed by: OBSTETRICS & GYNECOLOGY

## 2019-04-16 PROCEDURE — 1220000000 HC SEMI PRIVATE OB R&B

## 2019-04-16 RX ORDER — ACETAMINOPHEN 500 MG
1000 TABLET ORAL 3 TIMES DAILY
Qty: 30 TABLET | Refills: 1 | Status: ON HOLD | OUTPATIENT
Start: 2019-04-16 | End: 2021-04-17

## 2019-04-16 RX ORDER — ACETAMINOPHEN 500 MG
1000 TABLET ORAL EVERY 8 HOURS
Status: DISCONTINUED | OUTPATIENT
Start: 2019-04-16 | End: 2019-04-17 | Stop reason: HOSPADM

## 2019-04-16 RX ORDER — DOCUSATE SODIUM 100 MG/1
100 CAPSULE, LIQUID FILLED ORAL 2 TIMES DAILY
Status: DISCONTINUED | OUTPATIENT
Start: 2019-04-16 | End: 2019-04-17 | Stop reason: HOSPADM

## 2019-04-16 RX ORDER — IBUPROFEN 800 MG/1
800 TABLET ORAL EVERY 8 HOURS
Status: DISCONTINUED | OUTPATIENT
Start: 2019-04-16 | End: 2019-04-17 | Stop reason: HOSPADM

## 2019-04-16 RX ORDER — MONTELUKAST SODIUM 10 MG/1
10 TABLET ORAL DAILY
Qty: 30 TABLET | Refills: 3 | Status: SHIPPED | OUTPATIENT
Start: 2019-04-16 | End: 2019-09-30 | Stop reason: SDUPTHER

## 2019-04-16 RX ADMIN — CETIRIZINE HYDROCHLORIDE 10 MG: 10 TABLET, FILM COATED ORAL at 09:32

## 2019-04-16 RX ADMIN — ACETAMINOPHEN 1000 MG: 500 TABLET, FILM COATED ORAL at 09:33

## 2019-04-16 RX ADMIN — IBUPROFEN 800 MG: 800 TABLET, FILM COATED ORAL at 22:02

## 2019-04-16 RX ADMIN — MONTELUKAST SODIUM 10 MG: 10 TABLET, FILM COATED ORAL at 09:33

## 2019-04-16 RX ADMIN — IBUPROFEN 800 MG: 800 TABLET, FILM COATED ORAL at 14:23

## 2019-04-16 RX ADMIN — DOCUSATE SODIUM 100 MG: 100 CAPSULE, LIQUID FILLED ORAL at 09:32

## 2019-04-16 RX ADMIN — DOCUSATE SODIUM 100 MG: 100 CAPSULE, LIQUID FILLED ORAL at 22:02

## 2019-04-16 RX ADMIN — IBUPROFEN 600 MG: 600 TABLET ORAL at 05:42

## 2019-04-16 RX ADMIN — ACETAMINOPHEN 1000 MG: 500 TABLET, FILM COATED ORAL at 18:45

## 2019-04-16 ASSESSMENT — PAIN SCALES - GENERAL
PAINLEVEL_OUTOF10: 5
PAINLEVEL_OUTOF10: 5
PAINLEVEL_OUTOF10: 7
PAINLEVEL_OUTOF10: 4
PAINLEVEL_OUTOF10: 7

## 2019-04-16 NOTE — LACTATION NOTE
This note was copied from a baby's chart. Lactation Progress NOte-    Miguelito Oden called this LC to report that MOB called and requested formula. Requested that RN discuss using oral feeding syringes for feeding, and not bottles/nipples. This LC had already reviewed the benfits of breastfeeding. Per RN, MOB requested bottles, and a nipple, and desire formula feeding for supplements.

## 2019-04-16 NOTE — PROGRESS NOTES
[facility] post-partum note    Post-partum Day #1    Subjective:  Pain is : Mild  Lochia: thin lochia  Nausea: None  Ambulating, tolerate regular diet  Objective:  /64   Pulse 85   Temp 98.3 °F (36.8 °C) (Oral)   Resp 16   Ht 5' 9\" (1.753 m)   Wt 205 lb (93 kg)   LMP 07/04/2018   SpO2 96%   Breastfeeding? Unknown   BMI 30.27 kg/m²   Abdominal exam: soft, nontender, nondistended,fundus below umbilicus, firm.     Lab Results   Component Value Date    WBC 7.8 04/13/2019    HGB 12.9 04/13/2019    HCT 38.4 04/13/2019    MCV 84.7 04/13/2019     04/13/2019        Assessment/Plan:      Continue Postpartum care, encourage ambulation  Discharge today: yes  Follow up: 6 weeks

## 2019-04-16 NOTE — LACTATION NOTE
This note was copied from a baby's chart. Lactation Consult Note      LC to room per RN request to assist with feeding. MOB states nipples are both sore. NB latched well to breast for first feeding, then it hurt when fed 2nd feed through last feeding. Redness on R nipple/L nipple is more reddened, and more painful per MOB. NIpples are everted with holding breasts. Bilat breasts are WNL/everted; tissue stretchy. Colostrum expressed easily per mom(after shown per LC), NB latched easily on R breast in football hold. MOB used c-shape hold for initial latch; MOB wincing as NB latched, trying to break latch every time. ADDY, SRS, and AS. Mother denies pain with deeper latch. NB off breast at end feeding; nipple everted; no creasing. NB is looking satisfied after feeding. Reviewed plans to know if baby is getting enough at the breast, discussed, Five Keys to Successful Breastfeeding, and skin to skin. Discussed available lactation services after discharge including phone support, outpatient visits, and weight checks, and information on breastfeeding in binder.

## 2019-04-16 NOTE — LACTATION NOTE
This note was copied from a baby's chart. Lactation Progress NOte-  Discussed importance of compressing breast to size and shape of baby's mouth to get the most breast tissue in as possible. Shells provided to [facility] after feeding was complete. MOB has small bruise from NB sucking above the nipple when this LC had returned to room with shells. Observed and assisted to relatch NB(rooting) with a deeper latch.

## 2019-04-17 VITALS
WEIGHT: 205 LBS | OXYGEN SATURATION: 98 % | DIASTOLIC BLOOD PRESSURE: 67 MMHG | BODY MASS INDEX: 30.36 KG/M2 | TEMPERATURE: 98 F | HEART RATE: 70 BPM | RESPIRATION RATE: 16 BRPM | HEIGHT: 69 IN | SYSTOLIC BLOOD PRESSURE: 114 MMHG

## 2019-04-17 PROCEDURE — 6370000000 HC RX 637 (ALT 250 FOR IP): Performed by: OBSTETRICS & GYNECOLOGY

## 2019-04-17 RX ADMIN — MONTELUKAST SODIUM 10 MG: 10 TABLET, FILM COATED ORAL at 08:23

## 2019-04-17 RX ADMIN — ACETAMINOPHEN 1000 MG: 500 TABLET, FILM COATED ORAL at 02:31

## 2019-04-17 RX ADMIN — IBUPROFEN 800 MG: 800 TABLET, FILM COATED ORAL at 06:01

## 2019-04-17 RX ADMIN — ACETAMINOPHEN 1000 MG: 500 TABLET, FILM COATED ORAL at 10:41

## 2019-04-17 RX ADMIN — CETIRIZINE HYDROCHLORIDE 10 MG: 10 TABLET, FILM COATED ORAL at 08:23

## 2019-04-17 RX ADMIN — DOCUSATE SODIUM 100 MG: 100 CAPSULE, LIQUID FILLED ORAL at 08:23

## 2019-04-17 ASSESSMENT — PAIN SCALES - GENERAL
PAINLEVEL_OUTOF10: 5
PAINLEVEL_OUTOF10: 6
PAINLEVEL_OUTOF10: 4

## 2019-04-17 NOTE — PROGRESS NOTES
[facility] post-partum note    Post-partum Day #1    Subjective:    No c/o  Lochia: Normal  Bottle feeding    Objective:  Vitals:    04/16/19 1206 04/16/19 1600 04/17/19 0015 04/17/19 0817   BP:  112/69 113/64 114/67   Pulse:  70 75 70   Resp:  16 18 16   Temp: 98.3 °F (36.8 °C) 97.7 °F (36.5 °C) 97.7 °F (36.5 °C) 98 °F (36.7 °C)   TempSrc: Oral Oral Oral Oral   SpO2:   98%    Weight:       Height:           Physical Examination:  Appears well  Uterus: Firm, NT  Calves: NT    Labs:    No results for input(s): WBC, HGB, HCT, PLT in the last 72 hours. No results for input(s): NA, K, CL, CO2, BUN, CREATININE, CALCIUM, AST, ALT in the last 72 hours.     Invalid input(s): RENATE      Current Facility-Administered Medications:     acetaminophen (TYLENOL) tablet 1,000 mg, 1,000 mg, Oral, Q8H, Josie Garcia MD, 1,000 mg at 04/17/19 0231    ibuprofen (ADVIL;MOTRIN) tablet 800 mg, 800 mg, Oral, Q8H, Josie Garcia MD, 800 mg at 04/17/19 0601    docusate sodium (COLACE) capsule 100 mg, 100 mg, Oral, BID, Olivia Rodriguez MD, 100 mg at 04/17/19 0823    rho(D) immune globulin (HYPERRHO S/D) injection 300 mcg, 300 mcg, Intramuscular, Once, Jenniffer Constantino MD    oxyCODONE-acetaminophen (PERCOCET) 5-325 MG per tablet 1 tablet, 1 tablet, Oral, Q4H PRN **OR** oxyCODONE-acetaminophen (PERCOCET) 5-325 MG per tablet 2 tablet, 2 tablet, Oral, Q4H PRN, Jenniffer Constantino MD    [facility]) chewable tablet 80 mg, 80 mg, Oral, Q6H PRN, Jenniffer Constantino MD    magnesium hydroxide (MILK OF MAGNESIA) 400 MG/5ML suspension 30 mL, 30 mL, Oral, Daily PRN, Jenniffer Constantino MD    sennosides-docusate sodium (SENOKOT-S) 8.6-50 MG tablet 1 tablet, 1 tablet, Oral, Daily PRN, eJnniffer Constantino MD    ondansetron (ZOFRAN-ODT) disintegrating tablet 4 mg, 4 mg, Oral, Q6H PRN, Jenniffer Constantino MD    famotidine (PEPCID) tablet 20 mg, 20 mg, Oral, BID PRN, Jenniffer Constantino MD    ferrous sulfate tablet 325 mg, 325 mg, Oral, Daily, Jenniffer Constantino MD  [facility] measles, mumps and rubella vaccine (MMR) injection 0.5 mL, 0.5 mL, Subcutaneous, Prior to discharge, Mario Melendez MD    Tetanus-Diphth-Acell Pertussis (BOOSTRIX) injection 0.5 mL, 0.5 mL, Intramuscular, Prior to discharge, Mario Melendez MD    lanolin ointment, , Topical, PRN, Mario Melendez MD    hydrocortisone 2.5 % cream, , Topical, Q2H PRN, Mario Melendez MD    misoprostol (CYTOTEC) tablet 800 mcg, 800 mcg, Rectal, PRN, Mario Melendez MD    cetirizine (ZYRTEC) tablet 10 mg, 10 mg, Oral, Daily, Kb Tucker MD, 10 mg at 04/17/19 0823    montelukast (SINGULAIR) tablet 10 mg, 10 mg, Oral, Daily, Kb Tucker MD, 10 mg at 04/17/19 0823    oxytocin (PITOCIN) 30 units in 500 mL infusion, 1 oseas-units/min, Intravenous, Continuous, Kb Tucker MD, Last Rate: 5 mL/hr at 04/15/19 0634, 5 oseas-units/min at 04/15/19 0634    mometasone (ASMANEX) 220 MCG/INH inhaler 2 puff - PATIENT SUPPLIED, 2 puff, Inhalation, Daily, Sergio Bynum MD, 2 puff at 04/17/19 0824    albuterol sulfate  (90 Base) MCG/ACT inhaler 2 puff, 2 puff, Inhalation, Q6H PRN, Sergio Bynum MD    lactated ringers infusion, , Intravenous, Continuous, Sergio Bynum MD, Last Rate: 125 mL/hr at 04/15/19 0251    sodium chloride flush 0.9 % injection 10 mL, 10 mL, Intravenous, 2 times per day, Sergio Bynum MD    oxytocin (PITOCIN) 30 units in 500 mL infusion, 1 oseas-units/min, Intravenous, Continuous, Sergio Bynum MD, Last Rate: 1 mL/hr at 04/14/19 1419, 1 oseas-units/min at 04/14/19 1419    witch hazel-glycerin (TUCKS) pad, , Topical, PRN, Sergio Bynum MD     Assessment/Plan:      Post Partum: Continue Postpartum care  D/c home

## 2019-04-17 NOTE — ANESTHESIA POSTPROCEDURE EVALUATION
Department of Anesthesiology  Postprocedure Note    Patient: Jocelyn Carter  MRN: [id number]  YOB: 1980  Date of evaluation: 4/17/2019  Time:  6:38 AM     Procedure Summary     Date:  04/14/19 Room / Location:      Anesthesia Start:  1738 Anesthesia Stop:  04/15/19 0740    Procedure:  ANESTHESIA LABOR ANALGESIA Diagnosis:      Scheduled Providers:   Responsible Provider:  Olivia Chan MD    Anesthesia Type:  general, spinal, epidural ASA Status:  2          Anesthesia Type: general, spinal, epidural    Nicole Phase I: Nicole Score: 9    Nicole Phase II: Nicole Score: 10    Last vitals: Reviewed and per EMR flowsheets.        Anesthesia Post Evaluation    Patient location during evaluation: floor  Patient participation: complete - patient participated  Level of consciousness: awake and alert  Pain score: 0  Airway patency: patent  Nausea & Vomiting: no nausea and no vomiting  Complications: no  Cardiovascular status: blood pressure returned to baseline  Respiratory status: acceptable  Hydration status: euvolemic

## 2019-04-17 NOTE — PLAN OF CARE
Problem: Fluid Volume - Imbalance:  Goal: Absence of postpartum hemorrhage signs and symptoms  Description  Absence of postpartum hemorrhage signs and symptoms  4/16/2019 2312 by Morales Luke RN  Outcome: Ongoing  4/16/2019 0957 by Bess Bloom RN  Outcome: Ongoing     Problem: Discharge Planning:  Goal: Discharged to appropriate level of care  Description  Discharged to appropriate level of care  Outcome: Ongoing     Problem: Constipation:  Goal: Bowel elimination is within specified parameters  Description  Bowel elimination is within specified parameters  Outcome: Ongoing     Problem: Mood - Altered:  Goal: Mood stable  Description  Mood stable  4/16/2019 2312 by Morales Luke RN  Outcome: Ongoing  4/16/2019 0957 by Bess Bloom RN  Outcome: Ongoing

## 2019-04-17 NOTE — PROGRESS NOTES
Discharge instructions re-reviewed with patient/responsible adult. All home medications have been reviewed, questions answered and patient verbalized understanding. Discharge instructions signed and copies given. Patient discharged home with infant and all belongings.

## 2019-07-23 ENCOUNTER — TELEPHONE (OUTPATIENT)
Dept: FAMILY MEDICINE CLINIC | Age: 39
End: 2019-07-23

## 2019-08-19 ENCOUNTER — OFFICE VISIT (OUTPATIENT)
Dept: FAMILY MEDICINE CLINIC | Age: 39
End: 2019-08-19
Payer: COMMERCIAL

## 2019-08-19 VITALS
OXYGEN SATURATION: 97 % | SYSTOLIC BLOOD PRESSURE: 124 MMHG | HEART RATE: 107 BPM | BODY MASS INDEX: 28.47 KG/M2 | DIASTOLIC BLOOD PRESSURE: 72 MMHG | RESPIRATION RATE: 16 BRPM | WEIGHT: 192.8 LBS | TEMPERATURE: 99.2 F

## 2019-08-19 DIAGNOSIS — J02.9 SORE THROAT: Primary | ICD-10-CM

## 2019-08-19 DIAGNOSIS — R05.9 COUGH: ICD-10-CM

## 2019-08-19 LAB — S PYO AG THROAT QL: NORMAL

## 2019-08-19 PROCEDURE — 99213 OFFICE O/P EST LOW 20 MIN: CPT | Performed by: NURSE PRACTITIONER

## 2019-08-19 PROCEDURE — 87880 STREP A ASSAY W/OPTIC: CPT | Performed by: NURSE PRACTITIONER

## 2019-08-19 RX ORDER — LEVOFLOXACIN 500 MG/1
500 TABLET, FILM COATED ORAL DAILY
Qty: 10 TABLET | Refills: 0 | Status: SHIPPED | OUTPATIENT
Start: 2019-08-19 | End: 2019-08-29

## 2019-08-19 ASSESSMENT — ENCOUNTER SYMPTOMS
SORE THROAT: 1
RHINORRHEA: 0
COUGH: 1
WHEEZING: 0
SINUS PRESSURE: 1
SHORTNESS OF BREATH: 0
CHEST TIGHTNESS: 0

## 2019-08-19 ASSESSMENT — PATIENT HEALTH QUESTIONNAIRE - PHQ9
SUM OF ALL RESPONSES TO PHQ9 QUESTIONS 1 & 2: 0
SUM OF ALL RESPONSES TO PHQ QUESTIONS 1-9: 0
SUM OF ALL RESPONSES TO PHQ QUESTIONS 1-9: 0
2. FEELING DOWN, DEPRESSED OR HOPELESS: 0
1. LITTLE INTEREST OR PLEASURE IN DOING THINGS: 0

## 2019-08-19 NOTE — PATIENT INSTRUCTIONS
chances of quitting for good. · Use a vaporizer or humidifier to add moisture to your bedroom. Follow the directions for cleaning the machine. When should you call for help? Call your doctor now or seek immediate medical care if:    · You have new or worse trouble swallowing.     · Your sore throat gets much worse on one side.    Watch closely for changes in your health, and be sure to contact your doctor if you do not get better as expected. Where can you learn more? Go to [email or web address] and sign in to your "[employer] account. Enter A194 in the [employer] box to learn more about \"Sore Throat: Care Instructions. \"     If you do not have an account, please click on the \"Sign Up Now\" link. Current as of: October 21, 2018  Content Version: 12.1  © 8477-4165 Healthwise, Incorporated. Care instructions adapted under license by [facility] ([facility]). If you have questions about a medical condition or this instruction, always ask your healthcare professional. [place] [address] any warranty or liability for your use of this information.

## 2019-09-30 ENCOUNTER — OFFICE VISIT (OUTPATIENT)
Dept: FAMILY MEDICINE CLINIC | Age: 39
End: 2019-09-30
Payer: COMMERCIAL

## 2019-09-30 VITALS
OXYGEN SATURATION: 98 % | HEART RATE: 87 BPM | DIASTOLIC BLOOD PRESSURE: 70 MMHG | BODY MASS INDEX: 28.12 KG/M2 | SYSTOLIC BLOOD PRESSURE: 122 MMHG | WEIGHT: 190.4 LBS

## 2019-09-30 DIAGNOSIS — H10.9 CONJUNCTIVITIS OF BOTH EYES, UNSPECIFIED CONJUNCTIVITIS TYPE: Primary | ICD-10-CM

## 2019-09-30 DIAGNOSIS — J30.2 SEASONAL ALLERGIES: ICD-10-CM

## 2019-09-30 PROCEDURE — 99213 OFFICE O/P EST LOW 20 MIN: CPT | Performed by: NURSE PRACTITIONER

## 2019-09-30 RX ORDER — OLOPATADINE HYDROCHLORIDE 2 MG/ML
1 SOLUTION/ DROPS OPHTHALMIC DAILY
Qty: 1 BOTTLE | Refills: 0 | Status: SHIPPED | OUTPATIENT
Start: 2019-09-30 | End: 2020-02-28

## 2019-09-30 RX ORDER — MONTELUKAST SODIUM 10 MG/1
10 TABLET ORAL DAILY
Qty: 90 TABLET | Refills: 3 | Status: SHIPPED | OUTPATIENT
Start: 2019-09-30 | End: 2020-03-24 | Stop reason: SDUPTHER

## 2019-09-30 RX ORDER — POLYMYXIN B SULFATE AND TRIMETHOPRIM 1; 10000 MG/ML; [USP'U]/ML
1 SOLUTION OPHTHALMIC EVERY 6 HOURS
Qty: 1 BOTTLE | Refills: 0 | Status: SHIPPED | OUTPATIENT
Start: 2019-09-30 | End: 2019-10-07

## 2019-09-30 ASSESSMENT — ENCOUNTER SYMPTOMS
SORE THROAT: 1
EYE ITCHING: 1
COUGH: 1
EYE REDNESS: 1
RHINORRHEA: 1
PHOTOPHOBIA: 0
EYE DISCHARGE: 1
EYE PAIN: 1

## 2019-09-30 NOTE — PROGRESS NOTES
SUBJECTIVE:  Pt is a of 46 y.o. female comes in today with   Chief Complaint   Patient presents with    Conjunctivitis     pt thinks she has possible pink eye. She states she woke up with both eyes crusted and draining. Patient presenting today for evaluation of redness to left eye. Started 3 days ago. Spread to right eye am today. (+) itching and irritation. No vision changes. (+) drainage and crusting to eyes. Also c/o scratchy throat, nasal congestion, rhinorrhea, pnd, and cough. No improvement with Singulair and Zyrtec. Prior to Visit Medications    Medication Sig Taking? Authorizing Provider   Norgestim-Eth Estrad Triphasic (ORTHO TRI-CYCLEN, 28, PO) Take by mouth Yes Historical Provider, MD   acetaminophen (TYLENOL) 500 MG tablet Take 2 tablets by mouth 3 times daily Yes Josie Garcia MD   montelukast (SINGULAIR) 10 MG tablet Take 1 tablet by mouth daily Yes Daxa Lynn MD   ibuprofen (ADVIL;MOTRIN) 800 MG tablet Take 1 tablet by mouth every 6 hours as needed for Pain Yes Osker Bloch, MD   mometasone (ASMANEX 120 METERED DOSES) 220 MCG/INH inhaler Inhale 2 puffs into the lungs daily Yes JESSE Aldana CNP   albuterol sulfate HFA (PROAIR HFA) 108 (90 Base) MCG/ACT inhaler Inhale 2 puffs into the lungs every 6 hours as needed for Wheezing Yes JESSE Aldana CNP   cetirizine (ZYRTEC) 10 MG tablet Take 10 mg by mouth daily. Yes Historical Provider, MD   therapeutic multivitamin-minerals (THERAGRAN-M) tablet Take 1 tablet by mouth daily. Yes Historical Provider, MD         Patient's allergies, past medical, surgical, social and family histories werereviewed and updated as appropriate. Review of Systems   Constitutional: Negative for chills, fatigue and fever. HENT: Positive for congestion, postnasal drip, rhinorrhea and sore throat (scratchy). Negative for ear pain. Eyes: Positive for pain, discharge, redness and itching.  Negative for photophobia and visual

## 2020-02-28 ENCOUNTER — OFFICE VISIT (OUTPATIENT)
Dept: ORTHOPEDIC SURGERY | Age: 40
End: 2020-02-28
Payer: COMMERCIAL

## 2020-02-28 VITALS — WEIGHT: 183 LBS | BODY MASS INDEX: 26.2 KG/M2 | HEIGHT: 70 IN

## 2020-02-28 PROCEDURE — 99214 OFFICE O/P EST MOD 30 MIN: CPT | Performed by: ORTHOPAEDIC SURGERY

## 2020-02-28 NOTE — PROGRESS NOTES
bilaterally    Special Tests: Negative Lockman exam.  No instability to varus and valgus stress testing. Negative posterior drawer. Negative Apley Mary Grace    Skin: There are no rashes, ulcerations or lesions. Gait: Normal      Additional Comments:       Additional Examinations:         Thoracic Spine: Examination of the thoracic spine does not show any tenderness, deformity or injury. Range of motion is unremarkable. There is no gross instability. There are no rashes, ulcerations or lesions. Strength and tone are normal.  Neck: Examination of the neck does not show any tenderness, deformity or injury. Range of motion is unremarkable. There is no gross instability. There are no rashes, ulcerations or lesions. Strength and tone are normal.  Lower Back: Examination of the lower back does not show any tenderness, deformity or injury. Range of motion is unremarkable. There is no gross instability. There are no rashes, ulcerations or lesions. Strength and tone are normal.    Radiology:     X-rays obtained and reviewed in office:  Views 4 views of the right knee demonstrates no obvious fracture dislocation or other osseous abnormalities  4 views of the left knee demonstrates no obvious fracture dislocation or other osseous abnormalities         Assessment : Bilateral knee patellofemoral chondromalacia, left knee quadricep tendinitis    Impression:  Encounter Diagnoses   Name Primary?     Right knee pain, unspecified chronicity Yes    Left knee pain, unspecified chronicity        Office Procedures:  Orders Placed This Encounter   Procedures    XR KNEE RIGHT (MIN 4 VIEWS)     Standing Status:   Future     Number of Occurrences:   1     Standing Expiration Date:   2/28/2021    XR KNEE LEFT (MIN 4 VIEWS)     Standing Status:   Future     Number of Occurrences:   1     Standing Expiration Date:   2/28/2021    XR KNEE LEFT (MIN 4 VIEWS)     Standing Status:   Future     Standing Expiration Date:   2/28/2021 Treatment Plan: I would recommend at this time referral to physical therapy. She is agreeable with this plan. In addition she is going to avoid movements such as squatting or lunges which will exacerbate this pain. I would like to see her back in 6 weeks if this is not improving.   Would have to consider further imaging with MRI at that time

## 2020-03-24 RX ORDER — MONTELUKAST SODIUM 10 MG/1
10 TABLET ORAL DAILY
Qty: 90 TABLET | Refills: 0 | Status: SHIPPED | OUTPATIENT
Start: 2020-03-24 | End: 2020-07-20

## 2020-03-24 NOTE — TELEPHONE ENCOUNTER
Medication and Quantity requested: montelukast (SINGULAIR) 10 MG tablet  #90     Last Visit  9/30/19    Pharmacy and phone number updated in [facility]:  Yes, [phone] [address]

## 2020-06-02 LAB — GONADOTROPIN, CHORIONIC (HCG) QUANT: 5.2 MIU/ML

## 2020-06-04 LAB — GONADOTROPIN, CHORIONIC (HCG) QUANT: <5 MIU/ML

## 2020-07-08 LAB
HPV COMMENT: NORMAL
HPV TYPE 16: NOT DETECTED
HPV TYPE 18: NOT DETECTED
HPVOH (OTHER TYPES): NOT DETECTED

## 2020-07-20 RX ORDER — MONTELUKAST SODIUM 10 MG/1
TABLET ORAL
Qty: 90 TABLET | Refills: 0 | Status: SHIPPED | OUTPATIENT
Start: 2020-07-20 | End: 2020-10-23 | Stop reason: SDUPTHER

## 2020-07-20 NOTE — TELEPHONE ENCOUNTER
Medication:   Requested Prescriptions     Pending Prescriptions Disp Refills    montelukast (SINGULAIR) 10 MG tablet [Pharmacy Med Name: MONTELUKAST SODIUM 10MG TABS] 90 tablet 0     Sig: TAKE 1 TABLET BY MOUTH ONE TIME A DAY        Last Filled:  3/24/20 #90, 0 RF     Patient Phone Number: [phone] (home) [phone] (work)    Last appt: 9/30/2019 conjunctivitis   Next appt: Visit date not found

## 2020-09-11 ENCOUNTER — EMPLOYEE WELLNESS (OUTPATIENT)
Dept: OTHER | Age: 40
End: 2020-09-11

## 2020-10-05 LAB
ABO, EXTERNAL RESULT: NORMAL
ABO/RH: NORMAL
ANTIBODY SCREEN: NORMAL
HEP B, EXTERNAL RESULT: NORMAL
HEPATITIS C ANTIBODY INTERPRETATION: NORMAL
HIV, EXTERNAL RESULT: NORMAL
RH FACTOR, EXTERNAL RESULT: POSITIVE
RPR, EXTERNAL RESULT: NORMAL
RUBELLA TITER, EXTERNAL RESULT: NORMAL

## 2020-10-06 LAB
BASOPHILS ABSOLUTE: 0 K/UL (ref 0–0.2)
BASOPHILS RELATIVE PERCENT: 0.6 %
EOSINOPHILS ABSOLUTE: 0.1 K/UL (ref 0–0.6)
EOSINOPHILS RELATIVE PERCENT: 0.8 %
HCT VFR BLD CALC: 40.4 % (ref 36–48)
HEMOGLOBIN: 13.7 G/DL (ref 12–16)
HEPATITIS B SURFACE ANTIGEN INTERPRETATION: NORMAL
HIV AG/AB: NORMAL
HIV ANTIGEN: NORMAL
HIV-1 ANTIBODY: NORMAL
HIV-2 AB: NORMAL
LYMPHOCYTES ABSOLUTE: 2 K/UL (ref 1–5.1)
LYMPHOCYTES RELATIVE PERCENT: 28.8 %
MCH RBC QN AUTO: 29.3 PG (ref 26–34)
MCHC RBC AUTO-ENTMCNC: 33.8 G/DL (ref 31–36)
MCV RBC AUTO: 86.5 FL (ref 80–100)
MONOCYTES ABSOLUTE: 0.5 K/UL (ref 0–1.3)
MONOCYTES RELATIVE PERCENT: 6.6 %
NEUTROPHILS ABSOLUTE: 4.4 K/UL (ref 1.7–7.7)
NEUTROPHILS RELATIVE PERCENT: 63.2 %
PDW BLD-RTO: 13.6 % (ref 12.4–15.4)
PLATELET # BLD: 240 K/UL (ref 135–450)
PMV BLD AUTO: 8.7 FL (ref 5–10.5)
RBC # BLD: 4.67 M/UL (ref 4–5.2)
RUBELLA ANTIBODY IGG: >500 IU/ML
TOTAL SYPHILLIS IGG/IGM: NORMAL
URINE CULTURE, ROUTINE: NORMAL
WBC # BLD: 6.9 K/UL (ref 4–11)

## 2020-10-19 ENCOUNTER — OFFICE VISIT (OUTPATIENT)
Dept: PRIMARY CARE CLINIC | Age: 40
End: 2020-10-19
Payer: COMMERCIAL

## 2020-10-19 VITALS — BODY MASS INDEX: 25.68 KG/M2 | WEIGHT: 179 LBS

## 2020-10-19 PROCEDURE — 99211 OFF/OP EST MAY X REQ PHY/QHP: CPT | Performed by: NURSE PRACTITIONER

## 2020-10-19 NOTE — PROGRESS NOTES
Jarek Ventura received a viral test for COVID-19. They were educated on isolation and quarantine as appropriate. For any symptoms, they were directed to seek care from their PCP, given contact information to establish with a doctor, directed to an urgent care or the emergency room.

## 2020-10-20 LAB — SARS-COV-2, NAA: DETECTED

## 2020-10-20 NOTE — RESULT ENCOUNTER NOTE
Left detailed message, stated to call PCP, check mychart, and isolate. Your test came back detected/positive for Covid-19. What happens if I have a positive test?    If you have symptoms:  Isolate until all three of these things are true: 1) your symptoms are better, 2) it has been 10 days since you first felt sick, and 3) you have had no fever for at least 24 hours without using medicine that lowers fever. Drink plenty of fluids and eat when you can. You may take medicine for pain or fever if you need to. Rest as much as you can. If you do not have symptoms:  Stay home for 10 days after the date you were tested. If you develop symptoms during those 10 days, stay home until all three of these things are true: 1) your symptoms are better, 2) it has been 10 days since you first felt sick, and 3) you have had no fever for at least 24 hours without using medicine that lowers fever. Follow care instructions from your doctor or other healthcare provider. Seek emergency medical care immediately if you have trouble breathing, persistent pain or pressure in the chest, new confusion, inability to wake or stay awake, or bluish lips or face. Someone from the health department (case investigator or contact tracer) may reach out to you to check on your health and ask about other people you have been around or where you've spent time while you may have been able to spread COVID-19 to others. This person's role is strictly to map the virus to help identify people who may have been exposed to the virus and prevent its spread. The local health department will also provide guidance on how to stay safely at home to avoid spreading illness.

## 2020-10-21 ENCOUNTER — CARE COORDINATION (OUTPATIENT)
Dept: CARE COORDINATION | Age: 40
End: 2020-10-21

## 2020-10-21 NOTE — CARE COORDINATION
Patient contacted regarding [id number] diagnosis\". Discussed COVID-19 related testing which was available at this time. Test results were positive. Patient aware. Care Transition Nurse/ Ambulatory Care Manager contacted the patient by telephone to perform post discharge assessment. Verified name and  with patient as identifiers. Provided introduction to self, and explanation of the CTN/ACM role, and reason for call due to risk factors for infection and/or exposure to COVID-19. Symptoms reviewed with patient who verbalized the following symptoms: fatigue, cough, loss of taste or smell, nausea and symptoms started overnight, patient is pregnant and thinks some of the nausea is related to that and sinus problems may be related to not taking sinus medicines due to pregnancy. .      Due to new onset of symptoms encounter was not routed to provider for escalation. Discussed follow-up appointments. If no appointment was previously scheduled, appointment scheduling offered: Yes declined; calling her OB today to report + results today. Has already reported to pediatrician, as her 21 month old is negative. Wearing mask around her son.  is + covid as well. Domenic Cm Dr follow up appointment(s): No future appointments. Non-[facility] follow up appointment(s): routine OB appointments. Advance Care Planning:   Does patient have an Advance Directive:  reviewed and current. Patient has following risk factors of: asthma and pregnant 9 weeks . CTN/ACM reviewed discharge instructions, medical action plan and red flags such as increased shortness of breath, increasing fever and signs of decompensation with patient who verbalized understanding. Discussed exposure protocols and quarantine with CDC Guidelines What to do if you are sick with coronavirus disease .  Patient was given an opportunity for questions and concerns.  The patient agrees to contact the [facility] exposure line [phone], [facility] [address] Department of [facility]: ([phone]) and PCP office for questions related to their healthcare. CTN/ACM provided contact information for future needs. Reviewed and educated patient on any new and changed medications related to discharge diagnosis         Pt will be further monitored by COVID Loop Team based on severity of symptoms and risk factors.

## 2020-10-23 ENCOUNTER — CARE COORDINATION (OUTPATIENT)
Dept: CARE COORDINATION | Age: 40
End: 2020-10-23

## 2020-10-23 RX ORDER — MONTELUKAST SODIUM 10 MG/1
TABLET ORAL
Qty: 90 TABLET | Refills: 0 | Status: SHIPPED | OUTPATIENT
Start: 2020-10-23 | End: 2021-02-02 | Stop reason: SDUPTHER

## 2020-10-23 NOTE — TELEPHONE ENCOUNTER
Medication and Quantity requested: montelukast (SINGULAIR) 10 MG tablet         Last Visit  8/19/19    Pharmacy and phone number updated in EPIC:  Yes  [facility]

## 2020-10-23 NOTE — CARE COORDINATION
Yellow alert noted in Loop remote symptom monitoring program. Messaged patient to notify Gerre Sicard if symptoms have worsened since yesterday. RN response  Thank you for reporting your symptoms. Do you feel that your symptoms have worsened today from yesterday? Make sure you are drinking plenty of water, eating nutritious meals, and getting plenty of rest. Continue to quarantine at home if you are covid+ or awaiting test results. You may call me at [phone] if you need to speak with a nurse.

## 2020-11-03 ENCOUNTER — TELEPHONE (OUTPATIENT)
Dept: FAMILY MEDICINE CLINIC | Age: 40
End: 2020-11-03

## 2020-11-03 NOTE — TELEPHONE ENCOUNTER
Spoke to patient she is going to switch to dr Flower Monae for her pcp, this was sent to us in error

## 2020-11-03 NOTE — TELEPHONE ENCOUNTER
----- Message from [facility] sent at 11/3/2020  4:14 PM EST -----  Subject: Appointment Request    Reason for Call: New Patient Request Appointment    QUESTIONS  Type of Appointment? New Patient/New to Provider  Reason for appointment request? No appointments available during search  Additional Information for Provider? Pt's mother sees Behzad Becerra   and pt would like to establish care with him as well.  ---------------------------------------------------------------------------  --------------  CALL BACK INFO  What is the best way for the office to contact you? OK to leave message on   voicemail  Preferred Call Back Phone Number? [phone]  ---------------------------------------------------------------------------  --------------  SCRIPT ANSWERS  Relationship to Patient? Self  Appointment reason? Establish Care/Find a provider  Have you been diagnosed with   tested for   or told that you are suspected of having COVID-19 (Coronavirus)? Yes  Did your symptoms begin or have you been tested for COVID-19 in the last   10 days? No  Have you had a fever or taken medication to treat a fever within the past   3 days? No  Have you had a cough   shortness of breath or flu-like symptoms within the past 3 days? No  Do you currently have flu-like symptoms including fever or chills   cough   shortness of breath   or difficulty breathing   or new loss of taste or smell? No  (Service Expert  click yes below to proceed with [employer] As Usual   Scheduling)?  Yes

## 2020-11-03 NOTE — TELEPHONE ENCOUNTER
----- Message from Kenzie Zoniacamille sent at 11/3/2020  3:48 PM EST -----  Subject: Appointment Request    Reason for Call: New Patient Request Appointment    QUESTIONS  Type of Appointment? New Patient/New to Provider  Reason for appointment request? No appointments available during search  Additional Information for Provider? Pt would like to see Priya Hernandez to establish care. Her mother sees him and recommended him as   well.  ---------------------------------------------------------------------------  --------------  CALL BACK INFO  What is the best way for the office to contact you? OK to leave message on   voicemail  Preferred Call Back Phone Number? [phone]  ---------------------------------------------------------------------------  --------------  SCRIPT ANSWERS  Relationship to Patient? Self  Appointment reason? Establish Care/Find a provider  Have you been diagnosed with   tested for   or told that you are suspected of having COVID-19 (Coronavirus)? Yes  Did your symptoms begin or have you been tested for COVID-19 in the last   10 days? No  Have you had a fever or taken medication to treat a fever within the past   3 days? No  Have you had a cough   shortness of breath or flu-like symptoms within the past 3 days? No  Do you currently have flu-like symptoms including fever or chills   cough   shortness of breath   or difficulty breathing   or new loss of taste or smell? No  (Service Expert  click yes below to proceed with [facility] As Usual   Scheduling)?  Yes

## 2020-12-07 ENCOUNTER — OFFICE VISIT (OUTPATIENT)
Dept: FAMILY MEDICINE CLINIC | Age: 40
End: 2020-12-07
Payer: COMMERCIAL

## 2020-12-07 VITALS
BODY MASS INDEX: 26.86 KG/M2 | OXYGEN SATURATION: 98 % | WEIGHT: 187.6 LBS | SYSTOLIC BLOOD PRESSURE: 110 MMHG | HEART RATE: 115 BPM | HEIGHT: 70 IN | TEMPERATURE: 96.9 F | DIASTOLIC BLOOD PRESSURE: 70 MMHG

## 2020-12-07 PROBLEM — Z00.00 WELL ADULT EXAM: Status: ACTIVE | Noted: 2020-12-07

## 2020-12-07 PROBLEM — K59.01 SLOW TRANSIT CONSTIPATION: Status: ACTIVE | Noted: 2020-12-07

## 2020-12-07 PROBLEM — U07.1 COVID-19 VIRUS INFECTION: Status: ACTIVE | Noted: 2020-10-19

## 2020-12-07 PROBLEM — U07.1 COVID-19 VIRUS INFECTION: Status: RESOLVED | Noted: 2020-10-19 | Resolved: 2020-12-07

## 2020-12-07 PROCEDURE — 99395 PREV VISIT EST AGE 18-39: CPT | Performed by: FAMILY MEDICINE

## 2020-12-07 SDOH — ECONOMIC STABILITY: TRANSPORTATION INSECURITY
IN THE PAST 12 MONTHS, HAS THE LACK OF TRANSPORTATION KEPT YOU FROM MEDICAL APPOINTMENTS OR FROM GETTING MEDICATIONS?: NO

## 2020-12-07 SDOH — ECONOMIC STABILITY: FOOD INSECURITY: WITHIN THE PAST 12 MONTHS, YOU WORRIED THAT YOUR FOOD WOULD RUN OUT BEFORE YOU GOT MONEY TO BUY MORE.: NEVER TRUE

## 2020-12-07 SDOH — ECONOMIC STABILITY: INCOME INSECURITY: HOW HARD IS IT FOR YOU TO PAY FOR THE VERY BASICS LIKE FOOD, HOUSING, MEDICAL CARE, AND HEATING?: NOT HARD AT ALL

## 2020-12-07 SDOH — ECONOMIC STABILITY: TRANSPORTATION INSECURITY
IN THE PAST 12 MONTHS, HAS LACK OF TRANSPORTATION KEPT YOU FROM MEETINGS, WORK, OR FROM GETTING THINGS NEEDED FOR DAILY LIVING?: NO

## 2020-12-07 SDOH — ECONOMIC STABILITY: FOOD INSECURITY: WITHIN THE PAST 12 MONTHS, THE FOOD YOU BOUGHT JUST DIDN'T LAST AND YOU DIDN'T HAVE MONEY TO GET MORE.: NEVER TRUE

## 2020-12-07 ASSESSMENT — PATIENT HEALTH QUESTIONNAIRE - PHQ9
SUM OF ALL RESPONSES TO PHQ9 QUESTIONS 1 & 2: 0
1. LITTLE INTEREST OR PLEASURE IN DOING THINGS: 0
SUM OF ALL RESPONSES TO PHQ QUESTIONS 1-9: 0
SUM OF ALL RESPONSES TO PHQ QUESTIONS 1-9: 0
2. FEELING DOWN, DEPRESSED OR HOPELESS: 0
SUM OF ALL RESPONSES TO PHQ QUESTIONS 1-9: 0

## 2020-12-07 NOTE — PROGRESS NOTES
History and Physical      Amanda Marshall  YOB: 1980    Date of Service:  2020    Chief Complaint:   Amanda Marshall is a 45 y.o. female who presents for complete physical examination.     HPI: cpx  No cc-constipation    Wt Readings from Last 3 Encounters:   20 187 lb 9.6 oz (85.1 kg)   20 179 lb (81.2 kg)   20 183 lb (83 kg)     BP Readings from Last 3 Encounters:   20 110/70   19 122/70   19 124/72       Patient Active Problem List   Diagnosis    Tachycardia    Asthma    Cervical dysplasia    DDD (degenerative disc disease), cervical    Herniated cervical disc without myelopathy    Pes planus of both feet    AMA (advanced maternal age) primigravida 33+, third trimester     (spontaneous vaginal delivery)    Slow transit constipation    Well adult exam       Preventive Care:  Health Maintenance   Topic Date Due    Varicella vaccine (1 of 2 - 2-dose childhood series) 1982    Cervical cancer screen  2025    Flu vaccine  Completed    HIV screen  Completed    Hepatitis A vaccine  Aged Out    Hepatitis B vaccine  Aged Out    Hib vaccine  Aged Out    Meningococcal (ACWY) vaccine  Aged Out    Pneumococcal 0-64 years Vaccine  Aged Out      Hx abnormal PAP: no  Sexual activity: single partner, contraception - none   Self-breast exams: yes  Previous DEXA scan: no  Last eye exam: , normal  Exercise: walks 3 time(s) per week  Seatbelt use: +  Lipid panel:   Lab Results   Component Value Date    CHOL 156 2018    TRIG 107 2018    HDL 55 2018    LDLCALC 80 2018        Advance Directive: N, <no information>    Immunization History   Administered Date(s) Administered    Influenza Vaccine, unspecified formulation 10/01/2016    Influenza Virus Vaccine 10/13/2010, 10/02/2013, 10/06/2017, 10/04/2019, 2020    Pneumococcal Polysaccharide ([id number]) 02/15/2018    Tdap (Boostrix, Adacel) 2011       Allergies Allergen Reactions    Latex     Tetanus Toxoids Swelling     Red/warm/swollen left deltoid after adacel 1/31/2011    Tape Nadia Colder Tape]      Outpatient Medications Marked as Taking for the 12/7/20 encounter (Office Visit) with Jo Ayon MD   Medication Sig Dispense Refill    Prenatal Vit-DSS-Fe Cbn-FA (PRENATAL AD PO) Take by mouth      montelukast (SINGULAIR) 10 MG tablet TAKE 1 TABLET BY MOUTH ONE TIME A DAY 90 tablet 0    acetaminophen (TYLENOL) 500 MG tablet Take 2 tablets by mouth 3 times daily 30 tablet 1    mometasone (ASMANEX 120 METERED DOSES) 220 MCG/INH inhaler Inhale 2 puffs into the lungs daily 3 Inhaler 3    albuterol sulfate HFA (PROAIR HFA) 108 (90 Base) MCG/ACT inhaler Inhale 2 puffs into the lungs every 6 hours as needed for Wheezing 1 Inhaler 3    cetirizine (ZYRTEC) 10 MG tablet Take 10 mg by mouth daily.  therapeutic multivitamin-minerals (THERAGRAN-M) tablet Take 1 tablet by mouth daily.          Past Medical History:   Diagnosis Date    Allergic rhinitis     Asthma     Cervical dysplasia     Clau Brim COVID-19 virus infection 10/19/2020    Near syncope     Tachycardia     Varicella 1988     Past Surgical History:   Procedure Laterality Date    CERVICAL DISCECTOMY  3/31/14    C5-6, C6-7 ANTERIOR CERVICAL DISCECTOMY AND FUSION    EYE SURGERY      lasix    LEEP  11/11    Clau Brim TONSILLECTOMY AND ADENOIDECTOMY Bilateral 2003     Family History   Problem Relation Age of Onset    High Blood Pressure Mother     Heart Disease Father      Social History     Socioeconomic History    Marital status:      Spouse name: Not on file    Number of children: Not on file    Years of education: Not on file    Highest education level: Not on file   Occupational History    Occupation: RN at [employer] strain: Not hard at all   [employer] insecurity     Worry: Never true     Inability: Never true   [employer] needs     Medical: No     Non-medical: No   Tobacco Use    Smoking status: Never Smoker    Smokeless tobacco: Never Used   Substance and Sexual Activity    Alcohol use: Not Currently     Comment: every other weekend    Drug use: No    Sexual activity: Yes     Partners: Male   Lifestyle    Physical activity     Days per week: Not on file     Minutes per session: Not on file    Stress: Not on file   Relationships    Social connections     Talks on phone: Not on file     Gets together: Not on file     Attends Protestant service: Not on file     Active member of club or organization: Not on file     Attends meetings of clubs or organizations: Not on file     Relationship status: Not on file    Intimate partner violence     Fear of current or ex partner: Not on file     Emotionally abused: Not on file     Physically abused: Not on file     Forced sexual activity: Not on file   Other Topics Concern    Not on file   Social History Narrative    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 Physical Exam:   Vitals:    12/07/20 1008   BP: 110/70   Pulse: 115   Temp: 96.9 °F (36.1 °C)   SpO2: 98%   Weight: 187 lb 9.6 oz (85.1 kg)   Height: 5' 10\" (1.778 m)     Body mass index is 26.92 kg/m². Constitutional: She is oriented to person, place, and time. She appears well-developed and well-nourished. No distress. HEENT:   Head: Normocephalic and atraumatic. Right Ear: Tympanic membrane, external ear and ear canal normal.   Left Ear: Tympanic membrane, external ear and ear canal normal.   Nose: Nose normal.   Mouth/Throat: Oropharynx is clear and moist, and mucous membranes are normal.  There is no cervical adenopathy. Eyes: Conjunctivae and extraocular motions are normal. Pupils are equal, round, and reactive to light. Neck: Neck supple.  No JVD present. Carotid bruit is not present. No mass and no thyromegaly present. Cardiovascular: Normal rate, regular rhythm, normal heart sounds and intact distal pulses. Exam reveals no gallop and no friction rub. No murmur heard. Pulmonary/Chest: Effort normal and breath sounds normal. No respiratory distress. She has no wheezes, rhonchi or rales. Abdominal: Soft, non-tender. Bowel sounds and aorta are normal. She exhibits no organomegaly, mass or bruit. Genitourinary: performed by gynecologist.  Breast exam:  performed by specialist.  Musculoskeletal: Normal range of motion, no synovitis. She exhibits no edema. Neurological: She is alert and oriented to person, place, and time. She has normal reflexes. No cranial nerve deficit. Coordination normal.   Skin: Skin is warm and dry. There is no rash or erythema. No suspicious lesions noted. Psychiatric: She has a normal mood and affect. Her speech is normal and behavior is normal. Judgment, cognition and memory are normal.     Assessment/Plan:    Josie Fuller was seen today for new patient.     Diagnoses and all orders for this visit:    COVID-19 virus infection    Mild intermittent asthma with acute exacerbation    Slow transit constipation    Well adult exam

## 2021-01-06 PROBLEM — Z00.00 WELL ADULT EXAM: Status: RESOLVED | Noted: 2020-12-07 | Resolved: 2021-01-06

## 2021-01-12 ENCOUNTER — NURSE TRIAGE (OUTPATIENT)
Dept: OTHER | Facility: CLINIC | Age: 41
End: 2021-01-12

## 2021-01-12 NOTE — TELEPHONE ENCOUNTER
Patient called pre-service center [facility] Hank with red flag complaint. Brief description of triage: left eye pain at the corner by the bridge of the nose, painful, irritated, has used warm compresses, OTC eye drops, some clear tearing during the night. Triage indicates for patient to Be seen in office now. Care advice provided, patient verbalizes understanding; denies any other questions or concerns; instructed to call back for any new or worsening symptoms. Writer provided warm transfer to Jordyn at [facility] for appointment scheduling. Attention Provider: Thank you for allowing me to participate in the care of your patient. The patient was connected to triage in response to information provided to the [facility]. Please do not respond through this encounter as the response is not directed to a shared pool. Reason for Disposition   Eye pain/discomfort and more than mild    Answer Assessment - Initial Assessment Questions  1. ONSET: \"When did the pain start? \" (e.g., minutes, hours, days)      4-5 days ago     2. TIMING: \"Does the pain come and go, or has it been constant since it started? \" (e.g., constant, intermittent, fleeting)      Constant     3. SEVERITY: \"How bad is the pain? \"   (Scale 1-10; mild, moderate or severe)    - MILD (1-3): doesn't interfere with normal activities     - MODERATE (4-7): interferes with normal activities or awakens from sleep     - SEVERE (8-10): excruciating pain and patient unable to do normal activities      0-10/10    4. LOCATION: \"Where does it hurt? \"  (e.g., eyelid, eye, cheekbone)      Eye by bridge of nose,     5. CAUSE: \"What do you think is causing the pain? \"      Does not know, possibly allergies     6. VISION: \"Do you have blurred vision or changes in your vision? \"       Denies     7. EYE DISCHARGE: \"Is there any discharge (pus) from the eye(s)? \"  If yes, ask: \"What color is it? \"       Tears and walters at night      8. FEVER: \"Do you have a fever? \" If so, ask: \"What is it, how was it measured, and when did it start? \"       Denies     9. OTHER SYMPTOMS: \"Do you have any other symptoms? \" (e.g., headache, nasal discharge, facial rash)      Headache, feeling of sand in eye in the morning     10. PREGNANCY: \"Is there any chance you are pregnant? \" \"When was your last menstrual period? \"        21 weeks pregnant    Protocols used: EYE PAIN-ADULT-[place]

## 2021-01-13 ENCOUNTER — OFFICE VISIT (OUTPATIENT)
Dept: FAMILY MEDICINE CLINIC | Age: 41
End: 2021-01-13
Payer: COMMERCIAL

## 2021-01-13 VITALS
DIASTOLIC BLOOD PRESSURE: 70 MMHG | BODY MASS INDEX: 28.27 KG/M2 | OXYGEN SATURATION: 99 % | SYSTOLIC BLOOD PRESSURE: 106 MMHG | TEMPERATURE: 97.5 F | HEART RATE: 93 BPM | WEIGHT: 197 LBS

## 2021-01-13 DIAGNOSIS — H57.89 IRRITATION OF LEFT EYE: Primary | ICD-10-CM

## 2021-01-13 PROCEDURE — 99213 OFFICE O/P EST LOW 20 MIN: CPT | Performed by: NURSE PRACTITIONER

## 2021-01-13 ASSESSMENT — ENCOUNTER SYMPTOMS
SORE THROAT: 0
CHEST TIGHTNESS: 0
EYE PAIN: 1
EYE ITCHING: 1
TROUBLE SWALLOWING: 0
FACIAL SWELLING: 1
SHORTNESS OF BREATH: 0
EYE REDNESS: 1
GASTROINTESTINAL NEGATIVE: 1
COUGH: 0

## 2021-01-13 ASSESSMENT — PATIENT HEALTH QUESTIONNAIRE - PHQ9
SUM OF ALL RESPONSES TO PHQ9 QUESTIONS 1 & 2: 0
SUM OF ALL RESPONSES TO PHQ QUESTIONS 1-9: 0
2. FEELING DOWN, DEPRESSED OR HOPELESS: 0

## 2021-01-13 NOTE — PROGRESS NOTES
Crystal Sacks (:  1980) is a 45 y.o. female,Established patient, here for evaluation of the following chief complaint(s):  Eye Pain (Has allergies, when wakes up, knot near right eye)      ASSESSMENT/PLAN:  1. Irritation of left eye  Assessment & Plan:  See HPI  Resolved this morning, suspect blocked tear duct. Pt treated with allergy eye drops, warm compresses at home  F/u if returns, PRN      Return if symptoms worsen or fail to improve. SUBJECTIVE/OBJECTIVE:  Pt presents for L eye irritation, swelling, and pain which started 1 week ago, now resolved this morning. States started with painful \"knot\" in corner of L eye, tender to touch. Tried warm compresses - swelling underneath eye started with increased pain and irritation. Pt states having migraine-like pain into L temple as well. Pt has hx of allergies, taking zyrtec and singulair daily. Started taking allergy eye drops with little to no relief. Resolved suddenly this morning, only having little tenderness at this time. Pt denies cough, congestion, fevers, SOB. Feeling well otherwise. Review of Systems   Constitutional: Negative for fatigue and fever. HENT: Positive for facial swelling. Negative for congestion, ear discharge, ear pain, sore throat and trouble swallowing. Swelling to L eye, resolved this morning   Eyes: Positive for pain, redness and itching. Resolved this morning   Respiratory: Negative for cough, chest tightness and shortness of breath. Cardiovascular: Negative for chest pain and palpitations. Gastrointestinal: Negative. Endocrine: Negative. Genitourinary: Negative. Musculoskeletal: Negative. Allergic/Immunologic: Positive for environmental allergies. Neurological: Positive for headaches. Negative for dizziness and light-headedness. Hematological: Negative. Psychiatric/Behavioral: Negative. Physical Exam  Vitals signs reviewed.    Constitutional:       Appearance: Normal appearance. HENT:      Head: Normocephalic. Right Ear: Tympanic membrane, ear canal and external ear normal.      Left Ear: Tympanic membrane, ear canal and external ear normal.      Nose: Nose normal.      Mouth/Throat:      Mouth: Mucous membranes are moist.      Pharynx: Oropharynx is clear. Eyes:      Extraocular Movements: Extraocular movements intact. Conjunctiva/sclera: Conjunctivae normal.      Pupils: Pupils are equal, round, and reactive to light. Neck:      Musculoskeletal: Normal range of motion. Cardiovascular:      Rate and Rhythm: Normal rate and regular rhythm. Pulmonary:      Effort: Pulmonary effort is normal.      Breath sounds: Normal breath sounds. Skin:     General: Skin is warm and dry. Neurological:      Mental Status: She is alert and oriented to person, place, and time. Mental status is at baseline. An electronic signature was used to authenticate this note.     --JESSE Link - CNP

## 2021-01-13 NOTE — ASSESSMENT & PLAN NOTE
See HPI  Resolved this morning, suspect blocked tear duct.   Pt treated with allergy eye drops, warm compresses at home  F/u if returns, PRN

## 2021-02-02 DIAGNOSIS — J30.2 SEASONAL ALLERGIES: ICD-10-CM

## 2021-02-02 RX ORDER — MONTELUKAST SODIUM 10 MG/1
TABLET ORAL
Qty: 90 TABLET | Refills: 0 | Status: SHIPPED | OUTPATIENT
Start: 2021-02-02 | End: 2021-05-04 | Stop reason: SDUPTHER

## 2021-02-08 LAB
GLUCOSE CHALLENGE: 63 MG/DL
HCT VFR BLD CALC: 38.2 % (ref 36–48)
HEMOGLOBIN: 12.9 G/DL (ref 12–16)
MCH RBC QN AUTO: 30.3 PG (ref 26–34)
MCHC RBC AUTO-ENTMCNC: 33.9 G/DL (ref 31–36)
MCV RBC AUTO: 89.3 FL (ref 80–100)
PDW BLD-RTO: 13.8 % (ref 12.4–15.4)
PLATELET # BLD: 179 K/UL (ref 135–450)
PMV BLD AUTO: 8.6 FL (ref 5–10.5)
RBC # BLD: 4.27 M/UL (ref 4–5.2)
WBC # BLD: 6.9 K/UL (ref 4–11)

## 2021-04-17 ENCOUNTER — HOSPITAL ENCOUNTER (OUTPATIENT)
Age: 41
Discharge: HOME | End: 2021-04-17
Attending: OBSTETRICS & GYNECOLOGY | Admitting: OBSTETRICS & GYNECOLOGY
Payer: COMMERCIAL

## 2021-04-17 VITALS
DIASTOLIC BLOOD PRESSURE: 67 MMHG | TEMPERATURE: 98.2 F | HEART RATE: 67 BPM | SYSTOLIC BLOOD PRESSURE: 111 MMHG | RESPIRATION RATE: 18 BRPM

## 2021-04-17 PROBLEM — O47.9 THREATENED LABOR: Status: ACTIVE | Noted: 2021-04-17

## 2021-04-17 PROCEDURE — 59025 FETAL NON-STRESS TEST: CPT

## 2021-04-17 NOTE — PROCEDURES
FETAL SURVEILLANCE TESTING SUMMARY    INDICATIONS:  34 week pregnancy, AMA    OBJECTIVE RESULTS:  Fetal heart variability: moderate  Fetal Heart Rate decelerations: none  Fetal Heart Rate accelerations: yes  Baseline FHR: 130 per minute  Fetal Non-stress Test: reactive    Fetal surveillance: reassuring

## 2021-04-17 NOTE — PROGRESS NOTES
4 contractions noted with pt here for NST - reactive tracing. Pt does not feel contractions. Denies any pain.

## 2021-04-24 ENCOUNTER — HOSPITAL ENCOUNTER (OUTPATIENT)
Age: 41
Discharge: HOME | End: 2021-04-24
Attending: OBSTETRICS & GYNECOLOGY | Admitting: OBSTETRICS & GYNECOLOGY
Payer: COMMERCIAL

## 2021-04-24 VITALS
DIASTOLIC BLOOD PRESSURE: 64 MMHG | HEART RATE: 72 BPM | SYSTOLIC BLOOD PRESSURE: 95 MMHG | RESPIRATION RATE: 18 BRPM | TEMPERATURE: 97.8 F

## 2021-04-24 PROBLEM — Z36.89 NST (NON-STRESS TEST) REACTIVE: Status: ACTIVE | Noted: 2021-04-24

## 2021-04-24 PROCEDURE — 59025 FETAL NON-STRESS TEST: CPT

## 2021-04-24 NOTE — PROCEDURES
FETAL SURVEILLANCE TESTING SUMMARY    INDICATIONS:  Advanced Maternal Age    OBJECTIVE RESULTS:  Fetal heart variability: moderate  Fetal Heart Rate accelerations: yes  Baseline FHR: 130's per minute  Fetal Non-stress Test: reactive    Fetal surveillance: reassuring

## 2021-04-28 LAB — GBS, EXTERNAL RESULT: NEGATIVE

## 2021-05-01 ENCOUNTER — HOSPITAL ENCOUNTER (OUTPATIENT)
Age: 41
Discharge: HOME | End: 2021-05-01
Attending: OBSTETRICS & GYNECOLOGY | Admitting: OBSTETRICS & GYNECOLOGY
Payer: COMMERCIAL

## 2021-05-01 VITALS
TEMPERATURE: 97.7 F | DIASTOLIC BLOOD PRESSURE: 63 MMHG | HEART RATE: 75 BPM | RESPIRATION RATE: 18 BRPM | SYSTOLIC BLOOD PRESSURE: 105 MMHG

## 2021-05-01 PROCEDURE — 59025 FETAL NON-STRESS TEST: CPT

## 2021-05-04 DIAGNOSIS — J30.2 SEASONAL ALLERGIES: ICD-10-CM

## 2021-05-04 DIAGNOSIS — J45.31 MILD PERSISTENT ASTHMA WITH ACUTE EXACERBATION: ICD-10-CM

## 2021-05-04 RX ORDER — MONTELUKAST SODIUM 10 MG/1
TABLET ORAL
Qty: 90 TABLET | Refills: 0 | Status: SHIPPED | OUTPATIENT
Start: 2021-05-04 | End: 2021-08-12

## 2021-05-08 ENCOUNTER — HOSPITAL ENCOUNTER (OUTPATIENT)
Age: 41
Discharge: HOME | End: 2021-05-08
Attending: OBSTETRICS & GYNECOLOGY | Admitting: OBSTETRICS & GYNECOLOGY
Payer: COMMERCIAL

## 2021-05-08 VITALS
RESPIRATION RATE: 18 BRPM | SYSTOLIC BLOOD PRESSURE: 111 MMHG | DIASTOLIC BLOOD PRESSURE: 72 MMHG | HEART RATE: 74 BPM | TEMPERATURE: 98.4 F

## 2021-05-08 PROCEDURE — 59025 FETAL NON-STRESS TEST: CPT

## 2021-05-08 NOTE — PROCEDURES
FETAL SURVEILLANCE TESTING SUMMARY    INDICATIONS:  AMA    OBJECTIVE RESULTS:  Fetal heart variability: moderate  Fetal Heart Rate accelerations: yes  Baseline FHR: 120 per minute  Fetal Non-stress Test: reactive    Fetal surveillance: reassuring

## 2021-05-15 ENCOUNTER — HOSPITAL ENCOUNTER (OUTPATIENT)
Age: 41
Discharge: HOME | End: 2021-05-15
Attending: OBSTETRICS & GYNECOLOGY | Admitting: OBSTETRICS & GYNECOLOGY
Payer: COMMERCIAL

## 2021-05-15 VITALS
TEMPERATURE: 98.4 F | SYSTOLIC BLOOD PRESSURE: 116 MMHG | HEART RATE: 79 BPM | RESPIRATION RATE: 16 BRPM | DIASTOLIC BLOOD PRESSURE: 74 MMHG

## 2021-05-15 PROCEDURE — 59025 FETAL NON-STRESS TEST: CPT

## 2021-05-15 PROCEDURE — 99211 OFF/OP EST MAY X REQ PHY/QHP: CPT

## 2021-05-15 NOTE — PROCEDURES
FETAL SURVEILLANCE TESTING SUMMARY    INDICATIONS:  AMA    OBJECTIVE RESULTS:  Fetal heart variability: moderate    Fetal surveillance: reassuring

## 2021-05-19 ENCOUNTER — HOSPITAL ENCOUNTER (INPATIENT)
Age: 41
LOS: 1 days | Discharge: HOME | End: 2021-05-21
Attending: OBSTETRICS & GYNECOLOGY | Admitting: OBSTETRICS & GYNECOLOGY
Payer: COMMERCIAL

## 2021-05-19 ENCOUNTER — ANESTHESIA (OUTPATIENT)
Dept: LABOR AND DELIVERY | Age: 41
End: 2021-05-19
Payer: COMMERCIAL

## 2021-05-19 ENCOUNTER — ANESTHESIA EVENT (OUTPATIENT)
Dept: LABOR AND DELIVERY | Age: 41
End: 2021-05-19
Payer: COMMERCIAL

## 2021-05-19 LAB
ABO/RH: NORMAL
AMPHETAMINE SCREEN, URINE: NORMAL
ANTIBODY SCREEN: NORMAL
BARBITURATE SCREEN URINE: NORMAL
BASOPHILS ABSOLUTE: 0.1 K/UL (ref 0–0.2)
BASOPHILS RELATIVE PERCENT: 0.6 %
BENZODIAZEPINE SCREEN, URINE: NORMAL
BUPRENORPHINE URINE: NORMAL
CANNABINOID SCREEN URINE: NORMAL
COCAINE METABOLITE SCREEN URINE: NORMAL
EOSINOPHILS ABSOLUTE: 0.1 K/UL (ref 0–0.6)
EOSINOPHILS RELATIVE PERCENT: 0.6 %
HCT VFR BLD CALC: 39.7 % (ref 36–48)
HEMOGLOBIN: 13.6 G/DL (ref 12–16)
LYMPHOCYTES ABSOLUTE: 2.7 K/UL (ref 1–5.1)
LYMPHOCYTES RELATIVE PERCENT: 28.9 %
Lab: NORMAL
MCH RBC QN AUTO: 30.1 PG (ref 26–34)
MCHC RBC AUTO-ENTMCNC: 34.4 G/DL (ref 31–36)
MCV RBC AUTO: 87.7 FL (ref 80–100)
METHADONE SCREEN, URINE: NORMAL
MONOCYTES ABSOLUTE: 0.5 K/UL (ref 0–1.3)
MONOCYTES RELATIVE PERCENT: 5.8 %
NEUTROPHILS ABSOLUTE: 6 K/UL (ref 1.7–7.7)
NEUTROPHILS RELATIVE PERCENT: 64.1 %
OPIATE SCREEN URINE: NORMAL
OXYCODONE URINE: NORMAL
PDW BLD-RTO: 13.6 % (ref 12.4–15.4)
PH UA: 5
PHENCYCLIDINE SCREEN URINE: NORMAL
PLATELET # BLD: 202 K/UL (ref 135–450)
PMV BLD AUTO: 8.7 FL (ref 5–10.5)
PROPOXYPHENE SCREEN: NORMAL
RBC # BLD: 4.52 M/UL (ref 4–5.2)
WBC # BLD: 9.3 K/UL (ref 4–11)

## 2021-05-19 PROCEDURE — 86850 RBC ANTIBODY SCREEN: CPT

## 2021-05-19 PROCEDURE — 86900 BLOOD TYPING SEROLOGIC ABO: CPT

## 2021-05-19 PROCEDURE — 2580000003 HC RX 258: Performed by: OBSTETRICS & GYNECOLOGY

## 2021-05-19 PROCEDURE — 80307 DRUG TEST PRSMV CHEM ANLYZR: CPT

## 2021-05-19 PROCEDURE — 86780 TREPONEMA PALLIDUM: CPT

## 2021-05-19 PROCEDURE — 85025 COMPLETE CBC W/AUTO DIFF WBC: CPT

## 2021-05-19 PROCEDURE — 3E033VJ INTRODUCTION OF OTHER HORMONE INTO PERIPHERAL VEIN, PERCUTANEOUS APPROACH: ICD-10-PCS | Performed by: OBSTETRICS & GYNECOLOGY

## 2021-05-19 PROCEDURE — 86901 BLOOD TYPING SEROLOGIC RH(D): CPT

## 2021-05-19 RX ORDER — LIDOCAINE HYDROCHLORIDE 10 MG/ML
30 INJECTION, SOLUTION EPIDURAL; INFILTRATION; INTRACAUDAL; PERINEURAL PRN
Status: DISCONTINUED | OUTPATIENT
Start: 2021-05-19 | End: 2021-05-20

## 2021-05-19 RX ORDER — BUTORPHANOL TARTRATE 1 MG/ML
1 INJECTION, SOLUTION INTRAMUSCULAR; INTRAVENOUS
Status: DISCONTINUED | OUTPATIENT
Start: 2021-05-19 | End: 2021-05-20

## 2021-05-19 RX ORDER — SODIUM CHLORIDE, SODIUM LACTATE, POTASSIUM CHLORIDE, CALCIUM CHLORIDE 600; 310; 30; 20 MG/100ML; MG/100ML; MG/100ML; MG/100ML
INJECTION, SOLUTION INTRAVENOUS CONTINUOUS
Status: DISCONTINUED | OUTPATIENT
Start: 2021-05-19 | End: 2021-05-20

## 2021-05-19 RX ORDER — SODIUM CHLORIDE 0.9 % (FLUSH) 0.9 %
10 SYRINGE (ML) INJECTION PRN
Status: DISCONTINUED | OUTPATIENT
Start: 2021-05-19 | End: 2021-05-20

## 2021-05-19 RX ORDER — DOCUSATE SODIUM 100 MG/1
100 CAPSULE, LIQUID FILLED ORAL 2 TIMES DAILY
Status: DISCONTINUED | OUTPATIENT
Start: 2021-05-19 | End: 2021-05-20

## 2021-05-19 RX ORDER — TERBUTALINE SULFATE 1 MG/ML
0.25 INJECTION, SOLUTION SUBCUTANEOUS ONCE
Status: DISCONTINUED | OUTPATIENT
Start: 2021-05-19 | End: 2021-05-20

## 2021-05-19 RX ORDER — ONDANSETRON 2 MG/ML
4 INJECTION INTRAMUSCULAR; INTRAVENOUS EVERY 6 HOURS PRN
Status: DISCONTINUED | OUTPATIENT
Start: 2021-05-19 | End: 2021-05-20

## 2021-05-19 RX ORDER — SODIUM CHLORIDE 9 MG/ML
25 INJECTION, SOLUTION INTRAVENOUS PRN
Status: DISCONTINUED | OUTPATIENT
Start: 2021-05-19 | End: 2021-05-20

## 2021-05-19 RX ADMIN — SODIUM CHLORIDE, POTASSIUM CHLORIDE, SODIUM LACTATE AND CALCIUM CHLORIDE: 600; 310; 30; 20 INJECTION, SOLUTION INTRAVENOUS at 23:00

## 2021-05-20 LAB — TOTAL SYPHILLIS IGG/IGM: NORMAL

## 2021-05-20 PROCEDURE — 3700000025 EPIDURAL BLOCK: Performed by: ANESTHESIOLOGY

## 2021-05-20 PROCEDURE — 6360000002 HC RX W HCPCS: Performed by: ANESTHESIOLOGY

## 2021-05-20 PROCEDURE — 6370000000 HC RX 637 (ALT 250 FOR IP): Performed by: OBSTETRICS & GYNECOLOGY

## 2021-05-20 PROCEDURE — 6360000002 HC RX W HCPCS: Performed by: OBSTETRICS & GYNECOLOGY

## 2021-05-20 PROCEDURE — 0HQ9XZZ REPAIR PERINEUM SKIN, EXTERNAL APPROACH: ICD-10-PCS | Performed by: OBSTETRICS & GYNECOLOGY

## 2021-05-20 PROCEDURE — 1200000000 HC SEMI PRIVATE

## 2021-05-20 PROCEDURE — 10907ZC DRAINAGE OF AMNIOTIC FLUID, THERAPEUTIC FROM PRODUCTS OF CONCEPTION, VIA NATURAL OR ARTIFICIAL OPENING: ICD-10-PCS | Performed by: OBSTETRICS & GYNECOLOGY

## 2021-05-20 PROCEDURE — 2580000003 HC RX 258: Performed by: OBSTETRICS & GYNECOLOGY

## 2021-05-20 PROCEDURE — 7200000001 HC VAGINAL DELIVERY

## 2021-05-20 RX ORDER — IBUPROFEN 600 MG/1
600 TABLET ORAL EVERY 6 HOURS SCHEDULED
Status: DISCONTINUED | OUTPATIENT
Start: 2021-05-20 | End: 2021-05-21 | Stop reason: HOSPADM

## 2021-05-20 RX ORDER — SODIUM CHLORIDE 0.9 % (FLUSH) 0.9 %
5-40 SYRINGE (ML) INJECTION PRN
Status: DISCONTINUED | OUTPATIENT
Start: 2021-05-20 | End: 2021-05-21 | Stop reason: HOSPADM

## 2021-05-20 RX ORDER — OXYCODONE HYDROCHLORIDE AND ACETAMINOPHEN 5; 325 MG/1; MG/1
2 TABLET ORAL EVERY 4 HOURS PRN
Status: DISCONTINUED | OUTPATIENT
Start: 2021-05-20 | End: 2021-05-21 | Stop reason: HOSPADM

## 2021-05-20 RX ORDER — SIMETHICONE 80 MG
80 TABLET,CHEWABLE ORAL EVERY 6 HOURS PRN
Status: DISCONTINUED | OUTPATIENT
Start: 2021-05-20 | End: 2021-05-21 | Stop reason: HOSPADM

## 2021-05-20 RX ORDER — SODIUM CHLORIDE 9 MG/ML
25 INJECTION, SOLUTION INTRAVENOUS PRN
Status: DISCONTINUED | OUTPATIENT
Start: 2021-05-20 | End: 2021-05-21 | Stop reason: HOSPADM

## 2021-05-20 RX ORDER — SENNA AND DOCUSATE SODIUM 50; 8.6 MG/1; MG/1
1 TABLET, FILM COATED ORAL DAILY PRN
Status: DISCONTINUED | OUTPATIENT
Start: 2021-05-20 | End: 2021-05-21 | Stop reason: HOSPADM

## 2021-05-20 RX ORDER — SODIUM CHLORIDE 0.9 % (FLUSH) 0.9 %
5-40 SYRINGE (ML) INJECTION EVERY 12 HOURS SCHEDULED
Status: DISCONTINUED | OUTPATIENT
Start: 2021-05-20 | End: 2021-05-20

## 2021-05-20 RX ORDER — FAMOTIDINE 20 MG/1
20 TABLET, FILM COATED ORAL 2 TIMES DAILY PRN
Status: DISCONTINUED | OUTPATIENT
Start: 2021-05-20 | End: 2021-05-21 | Stop reason: HOSPADM

## 2021-05-20 RX ORDER — ONDANSETRON 4 MG/1
4 TABLET, ORALLY DISINTEGRATING ORAL EVERY 6 HOURS PRN
Status: DISCONTINUED | OUTPATIENT
Start: 2021-05-20 | End: 2021-05-21 | Stop reason: HOSPADM

## 2021-05-20 RX ORDER — ONDANSETRON 2 MG/ML
4 INJECTION INTRAMUSCULAR; INTRAVENOUS EVERY 6 HOURS PRN
Status: DISCONTINUED | OUTPATIENT
Start: 2021-05-20 | End: 2021-05-21 | Stop reason: HOSPADM

## 2021-05-20 RX ORDER — LIDOCAINE HYDROCHLORIDE AND EPINEPHRINE 15; 5 MG/ML; UG/ML
INJECTION, SOLUTION EPIDURAL PRN
Status: DISCONTINUED | OUTPATIENT
Start: 2021-05-20 | End: 2021-05-20 | Stop reason: SDUPTHER

## 2021-05-20 RX ORDER — IBUPROFEN 800 MG/1
800 TABLET ORAL EVERY 6 HOURS PRN
Qty: 50 TABLET | Refills: 3 | Status: SHIPPED | OUTPATIENT
Start: 2021-05-20

## 2021-05-20 RX ORDER — FENTANYL/BUPIVACAINE/NS/PF 2-1250MCG
12 PLASTIC BAG, INJECTION (ML) INJECTION CONTINUOUS
Status: DISCONTINUED | OUTPATIENT
Start: 2021-05-20 | End: 2021-05-20

## 2021-05-20 RX ORDER — DOCUSATE SODIUM 100 MG/1
100 CAPSULE, LIQUID FILLED ORAL 2 TIMES DAILY
Status: DISCONTINUED | OUTPATIENT
Start: 2021-05-20 | End: 2021-05-21 | Stop reason: HOSPADM

## 2021-05-20 RX ORDER — MODIFIED LANOLIN
OINTMENT (GRAM) TOPICAL PRN
Status: DISCONTINUED | OUTPATIENT
Start: 2021-05-20 | End: 2021-05-21 | Stop reason: HOSPADM

## 2021-05-20 RX ORDER — OXYCODONE HYDROCHLORIDE AND ACETAMINOPHEN 5; 325 MG/1; MG/1
1 TABLET ORAL EVERY 4 HOURS PRN
Status: DISCONTINUED | OUTPATIENT
Start: 2021-05-20 | End: 2021-05-21 | Stop reason: HOSPADM

## 2021-05-20 RX ORDER — ACETAMINOPHEN 325 MG/1
650 TABLET ORAL EVERY 4 HOURS PRN
Status: DISCONTINUED | OUTPATIENT
Start: 2021-05-20 | End: 2021-05-21 | Stop reason: HOSPADM

## 2021-05-20 RX ORDER — FERROUS SULFATE 325(65) MG
325 TABLET ORAL DAILY
Status: DISCONTINUED | OUTPATIENT
Start: 2021-05-20 | End: 2021-05-20

## 2021-05-20 RX ORDER — ACETAMINOPHEN 500 MG
500 TABLET ORAL 4 TIMES DAILY PRN
Qty: 30 TABLET | Refills: 1 | Status: SHIPPED | OUTPATIENT
Start: 2021-05-20

## 2021-05-20 RX ADMIN — HYDROCORTISONE: 25 CREAM TOPICAL at 13:41

## 2021-05-20 RX ADMIN — ACETAMINOPHEN 650 MG: 325 TABLET ORAL at 20:29

## 2021-05-20 RX ADMIN — WITCH HAZEL 40 EACH: 500 SOLUTION RECTAL; TOPICAL at 13:41

## 2021-05-20 RX ADMIN — BENZOCAINE AND LEVOMENTHOL: 200; 5 SPRAY TOPICAL at 09:28

## 2021-05-20 RX ADMIN — SODIUM CHLORIDE, POTASSIUM CHLORIDE, SODIUM LACTATE AND CALCIUM CHLORIDE: 600; 310; 30; 20 INJECTION, SOLUTION INTRAVENOUS at 00:11

## 2021-05-20 RX ADMIN — IBUPROFEN 600 MG: 600 TABLET ORAL at 16:13

## 2021-05-20 RX ADMIN — DOCUSATE SODIUM 100 MG: 100 CAPSULE ORAL at 20:24

## 2021-05-20 RX ADMIN — SODIUM CHLORIDE, POTASSIUM CHLORIDE, SODIUM LACTATE AND CALCIUM CHLORIDE: 600; 310; 30; 20 INJECTION, SOLUTION INTRAVENOUS at 03:01

## 2021-05-20 RX ADMIN — Medication 1 MILLI-UNITS/MIN: at 00:13

## 2021-05-20 RX ADMIN — LIDOCAINE HYDROCHLORIDE AND EPINEPHRINE 3 ML: 15; 5 INJECTION, SOLUTION EPIDURAL at 02:46

## 2021-05-20 RX ADMIN — IBUPROFEN 600 MG: 600 TABLET ORAL at 21:44

## 2021-05-20 RX ADMIN — Medication 87.3 MILLI-UNITS/MIN: at 07:49

## 2021-05-20 RX ADMIN — Medication 166.7 ML: at 07:35

## 2021-05-20 RX ADMIN — IBUPROFEN 600 MG: 600 TABLET ORAL at 09:27

## 2021-05-20 RX ADMIN — Medication 12 ML/HR: at 02:51

## 2021-05-20 RX ADMIN — DOCUSATE SODIUM 100 MG: 100 CAPSULE ORAL at 09:27

## 2021-05-20 ASSESSMENT — PAIN SCALES - GENERAL: PAINLEVEL_OUTOF10: 5

## 2021-05-20 NOTE — ANESTHESIA PROCEDURE NOTES
Epidural Block    Patient location during procedure: OB  Start time: 2021 2:36 AM  End time: 2021 2:46 AM  Reason for block: labor epidural  Staffing  Performed: resident/CRNA   Anesthesiologist: JESSE Christopher CRNA  Resident/CRNA: JESSE Christopher CRNA  Preanesthetic Checklist  Completed: patient identified, IV checked, site marked, risks and benefits discussed, surgical consent, monitors and equipment checked, pre-op evaluation, timeout performed, anesthesia consent given, oxygen available and patient being monitored  Epidural  Patient position: sitting  Prep: Betadine  Patient monitoring: cardiac monitor, continuous pulse ox and frequent blood pressure checks  Approach: midline  Location: lumbar (1-5)  Injection technique: JERZY air  Provider prep: mask and sterile gloves  Needle  Needle type: Tuohy   Needle gauge: 18 G  Needle length: 3.5 in  Needle insertion depth: 6 cm  Catheter type: side hole  Catheter size: 19 G (20 G)  Catheter at skin depth: 12 cm  Test dose: negative  Assessment  Sensory level: T10  Hemodynamics: stable  Attempts: 1  Additional Notes  Called for labor epidural analgesia request. Medical and Surgical history reviewed with pt. Risks/benefits of epidural discussed including allergic reaction, infection, bleeding, hypotension, headache, back pain, nerve damage, failed or one-sided block. Also discussed anesthesia options and associated risks in the event of . Questions answered. Verbalizes understanding and requests to proceed. Pt in sitting position. Labor epidural placed using JERZY sterile technique (donned mask and sterile gloves). Back prepped with [id number]. Sterile drape applied. Site: L3-4  JERZY:   6 cm. Attempts:  1   Re-directs:  0  Site infiltrated with 1%Lidocaine. 17G Tuohy needle inserted, JERZY technique with saline. No heme, CSF, or paresthesias noted. Epidural space dilated with saline.  #25ga pencan needle placed through tuohy for dural puncture. +CSF -heme or paresthesia. Spinal needle removed. Threaded epidural catheter through Tuohy needle easily. Tuohy needle withdrawn. Test Dose:           Negative aspiration. 3cc of 1.5% Lido with epi 1:200,000 test dose given. Negative test dose. Skin:  Xcm catheter taped at the skin. Secured with steri strips, tegaderm, and tape. Infusion:  .15% Ropivacaine with Fentanyl (2ug/cc)  Auto bolus 4 ml every 20 minutes. (Max. Dose- 40 ml/hr.)      Sensory Level:  R:  T10 L:  T10    VAS: start 6/10, end 3/10    Patient in supine position with left uterine displacement.  VSS:

## 2021-05-20 NOTE — PLAN OF CARE
Problem: Fluid Volume - Imbalance:  Goal: Absence of imbalanced fluid volume signs and symptoms  Description: Absence of imbalanced fluid volume signs and symptoms  Outcome: Ongoing  Goal: Absence of postpartum hemorrhage signs and symptoms  Description: Absence of postpartum hemorrhage signs and symptoms  Outcome: Ongoing     Problem: Infection - Risk of, Puerperal Infection:  Goal: Will show no infection signs and symptoms  Description: Will show no infection signs and symptoms  Outcome: Ongoing     Problem: Mood - Altered:  Goal: Mood stable  Description: Mood stable  Outcome: Ongoing     Problem: Pain - Acute:  Goal: Pain level will decrease  Description: Pain level will decrease  Outcome: Ongoing

## 2021-05-20 NOTE — ANESTHESIA PRE PROCEDURE
Department of Anesthesiology  Preprocedure Note       Name:  Johanna Fitzgerald   Age:  37 y.o.  :  1980                                          MRN:  [id number]         Date:  2021      Surgeon: * No surgeons listed *    Procedure: * No procedures listed *    Medications prior to admission:   Prior to Admission medications    Medication Sig Start Date End Date Taking? Authorizing Provider   montelukast (SINGULAIR) 10 MG tablet TAKE 1 TABLET BY MOUTH ONE TIME A DAY 21   Ebony Lindsey MD   [facility], 120 METERED DOSES,) 220 MCG/INH inhaler Inhale 2 puffs into the lungs daily 21   Ebony Lindsey MD   Prenatal Vit-DSS-Fe Cbn-FA (PRENATAL AD PO) Take by mouth    Historical Provider, MD   albuterol sulfate HFA (PROAIR HFA) 108 (90 Base) MCG/ACT inhaler Inhale 2 puffs into the lungs every 6 hours as needed for Wheezing  Patient not taking: Reported on 2021   JESSE Madden CNP   cetirizine (ZYRTEC) 10 MG tablet Take 10 mg by mouth daily.     Historical Provider, MD       Current medications:    Current Facility-Administered Medications   Medication Dose Route Frequency Provider Last Rate Last Admin    lactated ringers infusion   Intravenous Continuous Lucia Haines MD        sodium chloride flush 0.9 % injection 10 mL  10 mL Intravenous PRN Lucia Haines MD        0.9 % sodium chloride infusion  25 mL Intravenous PRN Lucia Haines MD        oxytocin (PITOCIN) 10 unit bolus from the bag  10 Units Intravenous PRN Lucia Haines MD        And    oxytocin (PITOCIN) 30 units in 500 mL infusion  87.3 oseas-units/min Intravenous Continuous PRN Lucia Haines MD        [facility]ron [facility] injection 4 mg  4 mg Intravenous Q6H PRN Lucia Haines MD        docusate sodium (COLACE) capsule 100 mg  100 mg Oral BID Lucia Haines MD        lidocaine PF 1 % injection 30 mL  30 mL Other PRN Lucia Haines MD        butorphanol (STADOL) injection 1 mg  1 mg Intravenous Q3H PRN Eddie Lawson Ashanti Ruiz MD        miSOPROStol (CYTOTEC) pre-split tablet TABS 25 mcg  25 mcg Vaginal Q4H Kendy Lam MD        oxytocin (PITOCIN) 30 units in 500 mL infusion  1-20 oseas-units/min Intravenous Continuous Kendy Lam MD        terbutaline (BRETHINE) injection 0.25 mg  0.25 mg Subcutaneous Once Kendy Lam MD        famotidine (PEPCID) injection 20 mg  20 mg Intravenous BID Kendy Lam MD           Allergies:     Allergies   Allergen Reactions    Latex     Adhesive Tape     Tetanus Toxoids Swelling     Red/warm/swollen left deltoid after adacel 2011       Problem List:    Patient Active Problem List   Diagnosis Code    Tachycardia R00.0    Asthma J45.909    Cervical dysplasia N87.9    DDD (degenerative disc disease), cervical M50.30    Herniated cervical disc without myelopathy M50.20    Pes planus of both feet M21.41, M21.42    AMA (advanced maternal age) primigravida 33+, third trimester O200.65     (spontaneous vaginal delivery) O80    Slow transit constipation K59.01    Irritation of left eye H57.89    Threatened labor O47.9    NST (non-stress test) reactive Z36.89       Past Medical History:        Diagnosis Date    Allergic rhinitis     Asthma     Cervical dysplasia     Lori Colon    COVID-19 virus infection 10/19/2020    Near syncope     Tachycardia     Varicella 1988       Past Surgical History:        Procedure Laterality Date    CERVICAL DISCECTOMY  3/31/14    C5-6, C6-7 ANTERIOR CERVICAL DISCECTOMY AND FUSION    EYE SURGERY      lasix    LEEP      Peola Kawasaki NECK SURGERY  2014    TONSILLECTOMY AND ADENOIDECTOMY Bilateral 2003       Social History:    Social History     Tobacco Use    Smoking status: Never Smoker    Smokeless tobacco: Never Used   Substance Use Topics    Alcohol use: Not Currently     Comment: every other weekend                                Counseling given: Not Answered      Vital Signs (Current):   Vitals:    21 2242   Resp: 16   Temp: 36.8 °C (98.2 °F)   TempSrc: Oral   Weight: 201 lb (91.2 kg)   Height: 5' 9\" (1.753 m)                                              BP Readings from Last 3 Encounters:   05/15/21 116/74   05/08/21 111/72   05/01/21 105/63       NPO Status: Time of last liquid consumption: 2145                        Time of last solid consumption: 1800                        Date of last liquid consumption: 05/19/21                        Date of last solid food consumption: 05/19/21    BMI:   Wt Readings from Last 3 Encounters:   05/19/21 201 lb (91.2 kg)   01/13/21 197 lb (89.4 kg)   12/07/20 187 lb 9.6 oz (85.1 kg)     Body mass index is 29.68 kg/m². CBC:   Lab Results   Component Value Date    WBC 6.9 02/08/2021    RBC 4.27 02/08/2021    HGB 12.9 02/08/2021    HCT 38.2 02/08/2021    MCV 89.3 02/08/2021    RDW 13.8 02/08/2021     02/08/2021       CMP:   Lab Results   Component Value Date     09/14/2016    K 3.8 09/14/2016     09/14/2016    CO2 23 09/14/2016    BUN 12 09/14/2016    CREATININE 0.8 09/14/2016    GFRAA >60 09/14/2016    GFRAA >60 11/14/2012    AGRATIO 1.2 09/14/2016    LABGLOM >60 09/14/2016    GLUCOSE 94 03/19/2018    PROT 7.3 09/14/2016    PROT 7.0 11/14/2012    CALCIUM 9.0 09/14/2016    BILITOT 0.3 09/14/2016    ALKPHOS 51 09/14/2016    AST 14 09/14/2016    ALT 11 09/14/2016       POC Tests: No results for input(s): POCGLU, POCNA, POCK, POCCL, POCBUN, POCHEMO, POCHCT in the last 72 hours.     Coags:   Lab Results   Component Value Date    PROTIME 10.9 03/25/2014    INR 0.97 03/25/2014    APTT 33.0 03/25/2014       HCG (If Applicable):   Lab Results   Component Value Date    PREGTESTUR Negative 03/31/2014        ABGs: No results found for: PHART, PO2ART, TVS8EGX, OWS4YTX, BEART, I5AZWGSA     Type & Screen (If Applicable):  No results found for: LABABO, LABRH    Drug/Infectious Status (If Applicable):  No results found for: HIV, HEPCAB    COVID-19 Screening (If Applicable):   Lab Results   Component Value Date    COVID19 DETECTED 10/19/2020           Anesthesia Evaluation  Patient summary reviewed and Nursing notes reviewed no history of anesthetic complications:   Airway: Mallampati: II  TM distance: >3 FB   Neck ROM: full  Mouth opening: > = 3 FB Dental: normal exam         Pulmonary:normal exam    (+) asthma:                            Cardiovascular:    (+) dysrhythmias (Tachy/francoise episodes ):,                   Neuro/Psych:               GI/Hepatic/Renal: Neg GI/Hepatic/Renal ROS            Endo/Other: Negative Endo/Other ROS                    Abdominal:           Vascular:                                        Anesthesia Plan      epidural     ASA 2     (Patient request epidural for labor and delivery. Discussed procedure and risks including but not limited to changes in VSS, allergic reaction, infection, intravascular injection, paresthesia, n/v, severe headache, temporary or permanent rare neurologic sequelae and failed block. All questions answered. Patient agreed to proceed)        Anesthetic plan and risks discussed with patient. Use of blood products discussed with patient whom consented to blood products. Plan discussed with CRNA and attending.     Attending anesthesiologist reviewed and agrees with Pre Eval content              JESSE Velasco - CRNA   5/19/2021

## 2021-05-20 NOTE — FLOWSHEET NOTE
Assisted up to bathroom, gait steady with stand by assistance only. Pt instructed on perineal care and self administration of perineal meds. Pt verbalized understanding and may self medicate. Pt instructed to keep medications out of the reach of children.

## 2021-05-20 NOTE — PLAN OF CARE
Pt started pushing at () [phone], effectively. Dr Travon Carroll at bedside to attend delivery. Pt pushed 2 times and delivered head, body followed at 0730 via  per Dr Travon Carroll. Infant placed skin to skin with mother. Father cut cord at 80. Placenta delivered spontaneous at 80. Repair of 1st degree tear per Dr Travon Carroll. Pitocin given per protocol. Epidural turned off. Final count done per MD and RN, and verified as correct.  Vaginal sweep performed per MD

## 2021-05-21 VITALS
HEART RATE: 77 BPM | SYSTOLIC BLOOD PRESSURE: 129 MMHG | OXYGEN SATURATION: 99 % | HEIGHT: 69 IN | TEMPERATURE: 97.1 F | BODY MASS INDEX: 29.77 KG/M2 | DIASTOLIC BLOOD PRESSURE: 75 MMHG | WEIGHT: 201 LBS | RESPIRATION RATE: 12 BRPM

## 2021-05-21 PROCEDURE — 6370000000 HC RX 637 (ALT 250 FOR IP): Performed by: OBSTETRICS & GYNECOLOGY

## 2021-05-21 RX ADMIN — ACETAMINOPHEN 650 MG: 325 TABLET ORAL at 09:45

## 2021-05-21 RX ADMIN — IBUPROFEN 600 MG: 600 TABLET ORAL at 06:17

## 2021-05-21 RX ADMIN — ACETAMINOPHEN 650 MG: 325 TABLET ORAL at 01:01

## 2021-05-21 ASSESSMENT — PAIN SCALES - GENERAL
PAINLEVEL_OUTOF10: 5
PAINLEVEL_OUTOF10: 5
PAINLEVEL_OUTOF10: 3

## 2021-05-21 NOTE — PROCEDURES
Department of Obstetrics and Gynecology  Spontaneous Vaginal Delivery Note    Labor & Delivery Summary  Dilation Complete Date: 21  Dilation Complete Time: 4180    Pre-operative Diagnosis:  Term pregnancy    Post-operative Diagnosis:  Same, delivered    Procedure:  Spontaneous vaginal delivery    Surgeon:  Dawn Hood MD    Information for the patient's :  Alessio Uriostegui [[id number]]     Alessio Uriostegui [[id number]]    Apgars    Living status: Living  Apgars   1 Minute:  5 Minute:  10 Minute 15 Minute 20 Minute   Skin Color: 1  1       Heart Rate: 2  2       Reflex Irritability: 2  2       Muscle Tone: 2  2       Respiratory Effort: 2  2       Total: 9  9               Apgars Assigned By: TBTD RN             Information for the patient's :  Alessio Uriostegui [[id number]]   Birth Weight: 8 lb 7.8 oz (3.85 kg)       Anesthesia:  epidural anesthesia    Estimated blood loss:  300ml    Specimen:  Placenta not sent to pathology     Cord gas sent No    Complications:  none    Condition:  infant stable to general nursery    Details of Procedure: The patient is a 37 y.o. female at 36w0d   OB History        2    Para   2    Term   2            AB        Living   2       SAB        TAB        Ectopic        Molar        Multiple   0    Live Births   2          Obstetric Comments   No med problems          who was admitted for induction. She received the following interventions: ARBOW and IV Pitocin induction=. The patient progressed 4cm did receive an epidural, became complete and started to push. After pushing for 2  ctx the fetal head was at the perineum, the nose and mouth suctioned with bulb suction and the rest of the infant delivered atraumatically, and was placed on the mother's abdomen. The cord was clamped and cut after 1 minute delay. The delivery of the placenta was spontaneous.  The perineum and vagina were explored and a first degree laceration was repaired in standard fashion.

## 2021-05-21 NOTE — PLAN OF CARE
Problem: Discharge Planning:  Goal: Discharged to appropriate level of care  Description: Discharged to appropriate level of care  5/21/2021 1055 by Radha Rangel RN  Outcome: Completed  5/21/2021 0448 by Ryan Robledo RN  Outcome: Ongoing     Problem: Constipation:  Goal: Bowel elimination is within specified parameters  Description: Bowel elimination is within specified parameters  5/21/2021 1055 by Radha Rangel RN  Outcome: Completed  5/21/2021 0448 by Ryan Robledo RN  Outcome: Ongoing     Problem: Fluid Volume - Imbalance:  Goal: Absence of imbalanced fluid volume signs and symptoms  Description: Absence of imbalanced fluid volume signs and symptoms  5/21/2021 1055 by Radha Rangel RN  Outcome: Completed  5/21/2021 0448 by Ryan Robledo RN  Outcome: Ongoing  Goal: Absence of postpartum hemorrhage signs and symptoms  Description: Absence of postpartum hemorrhage signs and symptoms  5/21/2021 1055 by Radha Rangel RN  Outcome: Completed  5/21/2021 0448 by Ryan Robledo RN  Outcome: Ongoing     Problem: Infection - Risk of, Puerperal Infection:  Goal: Will show no infection signs and symptoms  Description: Will show no infection signs and symptoms  5/21/2021 1055 by Radha Rangel RN  Outcome: Completed  5/21/2021 0448 by Ryan Robledo RN  Outcome: Ongoing     Problem: Pain - Acute:  Goal: Pain level will decrease  Description: Pain level will decrease  5/21/2021 1055 by Radha Rangel RN  Outcome: Completed  5/21/2021 0448 by Ryan Robledo RN  Outcome: Ongoing     Problem: Mood - Altered:  Goal: Mood stable  Description: Mood stable  5/21/2021 1055 by Radha Rangel RN  Outcome: Completed  5/21/2021 0448 by Ryan Robledo RN  Outcome: Ongoing     Problem: Infection - Risk of, Puerperal Infection:  Goal: Will show no infection signs and symptoms  Description: Will show no infection signs and symptoms  5/21/2021 1055 by Radha Rangel RN  Outcome: Completed  5/21/2021 0448 by Ryan Robledo

## 2021-05-21 NOTE — PROGRESS NOTES
[facility] post-partum note    Post-partum Day #1    Subjective:    Bottle feeding. No complaints. Lochia: Normal    Objective:  Vitals:    05/20/21 1336 05/20/21 1759 05/21/21 0130 05/21/21 0900   BP: (!) 95/54 112/67 (!) 96/57 129/75   Pulse: 59 71 60 77   Resp: 16 16 16 12   Temp: 97.4 °F (36.3 °C) 97.1 °F (36.2 °C) 97.4 °F (36.3 °C) 97.1 °F (36.2 °C)   TempSrc: Oral Oral Oral Oral   SpO2:       Weight:       Height:           Physical Examination:  Appears well  Uterus: Firm, NT  Calves: NT    Labs:    Recent Labs     05/19/21  2245   WBC 9.3   HGB 13.6   HCT 39.7        No results for input(s): NA, K, CL, CO2, BUN, CREATININE, CALCIUM, AST, ALT in the last 72 hours.     Invalid input(s): RENATE      Current Facility-Administered Medications:     sodium chloride flush 0.9 % injection 5-40 mL, 5-40 mL, Intravenous, PRN, Iwona Dunlap MD    0.9 % sodium chloride infusion, 25 mL, Intravenous, PRN, Iwona Dunlap MD    acetaminophen (TYLENOL) tablet 650 mg, 650 mg, Oral, Q4H PRN, Iwona Dunlap MD, 650 mg at 05/21/21 0945    ibuprofen (ADVIL;MOTRIN) tablet 600 mg, 600 mg, Oral, 4 times per day, Iwona Dunlap MD, 600 mg at 05/21/21 0617    oxyCODONE-acetaminophen (PERCOCET) 5-325 MG per tablet 1 tablet, 1 tablet, Oral, Q4H PRN **OR** oxyCODONE-acetaminophen (PERCOCET) 5-325 MG per tablet 2 tablet, 2 tablet, Oral, Q4H PRN, Iwona Dunlap MD    ondansetron [facility]) injection 4 mg, 4 mg, Intravenous, Q6H PRN, Iwona Dunlap MD    ondansetron (ZOFRAN-ODT) disintegrating tablet 4 mg, 4 mg, Oral, Q6H PRN, Iwona Dunlap MD    famotidine (PEPCID) tablet 20 mg, 20 mg, Oral, BID PRN, Iwona Dunlap MD    [facility]) chewable tablet 80 mg, 80 mg, Oral, Q6H PRN, Iwona Dunlap MD    docusate sodium (COLACE) capsule 100 mg, 100 mg, Oral, BID, Iwona Dunlap MD, 100 mg at 05/20/21 2024    magnesium hydroxide (MILK OF MAGNESIA) 400 MG/5ML suspension 30 mL, 30 mL, Oral, Daily PRN, Iwona Dunlap MD  [facility] sennosides-docusate sodium (SENOKOT-S) 8.6-50 MG tablet 1 tablet, 1 tablet, Oral, Daily PRN, Pratik Segal MD    lansinoh lanolin ointment, , Topical, PRN, Pratik Segal MD    witch hazel-glycerin (TUCKS) pad, , Topical, PRN, Pratik Segal MD, 40 each at 05/20/21 1341    hydrocortisone 2.5 % cream, , Topical, Q2H PRN, Pratik Segal MD, Given at 05/20/21 1341    benzocaine-menthol (DERMOPLAST) 20-0.5 % spray, , Topical, PRN, Pratik Segal MD, Given at 05/20/21 6346     Assessment/Plan:      Post Partum: advance Postpartum care.  Discharge home today

## 2021-05-21 NOTE — ANESTHESIA POSTPROCEDURE EVALUATION
Department of Anesthesiology  Postprocedure Note    Patient: Kelsey Fang  MRN: [id number]  YOB: 1980  Date of evaluation: 5/21/2021  Time:  12:14 PM     Procedure Summary     Date: 05/20/21 Room / Location:     Anesthesia Start: 0236 Anesthesia Stop: 0730    Procedure: Labor Analgesia Diagnosis:     Scheduled Providers:  Responsible Provider: Kevin Ty MD    Anesthesia Type: epidural ASA Status: 2          Anesthesia Type: epidural    Nicole Phase I: Nicole Score: 10    Nicole Phase II: Nicole Score: 10    Last vitals: Reviewed and per EMR flowsheets. Anesthesia Post Evaluation    Patient location during evaluation: floor  Patient participation: complete - patient participated  Level of consciousness: awake and alert  Pain score: 0  Airway patency: patent  Nausea & Vomiting: nausea  Complications: no  Cardiovascular status: hemodynamically stable  Hydration status: euvolemic    Patient s/p epidural for L&D. Pt denies residual numbness post block. Patient is ambulating and voiding without difficulty. Patient denies back pain, headache, paresthesias, n/v or pruritus. Epidural site is free of signs of infection.

## 2021-05-21 NOTE — DISCHARGE SUMMARY
Obstetrical Discharge Form    Gestational Age: 39w0d    Antepartum complications: none    Date of Delivery: 21      Type of Delivery: vaginal, spontaneous    Delivered By: Anne-Marieleona Penaloza     Baby:      Information for the patient's :  Brandee Jj [[id number]]   APGAR One: 9     Information for the patient's :  Brandee Jj [[id number]]   APGAR Five: 9     Information for the patient's :  Brandee Jj [[id number]]   Birth Weight: 8 lb 7.8 oz (3.85 kg)       Anesthesia: Epidural    Intrapartum complications: None    Postpartum complications: none    Discharge Medication:    Ingrid Dueñas   [place] Medication Instructions [id number]    Printed on:21 0902   Medication Information                      acetaminophen (TYLENOL) 500 MG tablet  Take 1 tablet by mouth 4 times daily as needed for Pain             albuterol sulfate HFA (PROAIR HFA) 108 (90 Base) MCG/ACT inhaler  Inhale 2 puffs into the lungs every 6 hours as needed for Wheezing             cetirizine (ZYRTEC) 10 MG tablet  Take 10 mg by mouth daily. ibuprofen (ADVIL;MOTRIN) 800 MG tablet  Take 1 tablet by mouth every 6 hours as needed for Pain             mometasone (ASMANEX, 120 METERED DOSES,) 220 MCG/INH inhaler  Inhale 2 puffs into the lungs daily             montelukast (SINGULAIR) 10 MG tablet  TAKE 1 TABLET BY MOUTH ONE TIME A DAY             Prenatal Vit-DSS-Fe Cbn-FA (PRENATAL AD PO)  Take by mouth                  Discharge Condition:  good    Discharge Date: 21    PLAN:  Follow up in 6 weeks for routine PP visit  All questions answered  D/C summary begun at delivery for D/C planning purposes, any delay in discharge from ordered D/C date due to  factors.

## 2021-06-02 ENCOUNTER — FOLLOWUP TELEPHONE ENCOUNTER (OUTPATIENT)
Dept: OBGYN | Age: 41
End: 2021-06-02

## 2021-06-02 NOTE — TELEPHONE ENCOUNTER
As you are pregnant or have recently had a baby, we would like to know how you are feeling. Please bold the answer that comes closet to how you have felt in THE PAST 7 DAYS, not just how you feel today. IN THE PAST 7 DAYS. 1. I have been able to laugh and see the funny side of things  Much as I always could  Not quite so much now  Definitely not so much now  Not at all  2. I have looked forward with enjoyment to things    As much as I ever did              Rather less than I used to   Definitely less than I used to   Hardly at all  3. I have blamed myself unnecessarily when things went wrong  Yes most of the time   Yes some of the time   Not very often   No, never  4. I have been anxious or worried for no good reason  No not at all   Hardly ever   Yes sometimes   Yes very often  5. I have felt scared or panicky for no good reason   Yes quite a lot   Yes sometimes   No not much   No not at all  6. Things have been getting me on top of me   Yes most of the time I haven't been able to cope at all. Yes,sometimes I haven't been coping as well as usual.   No, most of the time I have coped quite well   No I have been coping as well as ever  7. I have been so unhappy that I have difficulty sleeping    Yes most of the time   Yes sometimes   Not very often   No not at all  8. I have felt sad or miserable   Yes most of the time   Yes quite often   Not very often   No not at all  9. I have been so unhappy that I have been crying   Yes most of the time   Yes quite often   Only occasionally   No never  10. The thought of harming myself has occurred to me   Yes quite often   Sometimes   Hardly ever   Never                                                                 SCORING  Postpartum depression is the most common complication of childbearing. The 10-questions [place]  Depression Scaled (EPDS) is a valuable and efficient way of identifying patients of risk for \"\" depression.   The EPIDS is easy to administer and has proven to be effective screening tool. Mother who score above 13 are likely to be suffering from a depressive illness of varying severity. The EPIDS score should not override clinical judgement. Are careful assessment should be carried out to confirm the diagnosis. The scale indicates how the mother has felt during the previous week. In doubtful cases it may be useful to repeat the tool after 2 weeks. The scale will not detect mother's with anxiety neuroses, phobias or personality disorders. Women with postpartum depression need not feed alone. They may find useful information on the web sites of the [address] [place] [address] [employer] Questions 1,2, & 4 (without an *)  Are scored 0, 1, 2, or 3 with top box score as 0 and the bottom box scored as 3. Questions 3, 5-10 (marked with and *)  Are reverse scored, with the top  scored as a 3 and the bottom scored as 0. Maximum score:                                   30  Possible Depression :                           10 or greater  Always look at item 10            (suicidal thoughts)         Instructions for using the [place]  Depression Scale:    2. The mother is asked to check the response that comes closet to how she has been feeling         In the previous 7 days. 2.    All the items must be completed. 3.    Care should be taken to avoid the possibility of the mother discussing her answers with         Others. (Answer come from the mother or pregnant woman.)  4. The mother should complete the scale herself, unless she has limited English has            difficulty with reading. [place]  Depression Scale completed pt score 1, pt reports no issues with depression. No interventions needed at this time.

## 2021-07-13 NOTE — PROCEDURES
FETAL SURVEILLANCE TESTING SUMMARY    INDICATIONS:  34 week pregnancy, AMA    OBJECTIVE RESULTS:  Fetal heart variability: moderate  Fetal Heart Rate decelerations: none  Fetal Heart Rate accelerations: yes  Baseline FHR: 130 per minute  Fetal Non-stress Test: reactive    Fetal surveillance: reassuring   7/13/21 addendum: copied to correct encounter

## 2021-08-12 DIAGNOSIS — J30.2 SEASONAL ALLERGIES: ICD-10-CM

## 2021-08-12 RX ORDER — MONTELUKAST SODIUM 10 MG/1
TABLET ORAL
Qty: 90 TABLET | Refills: 0 | Status: SHIPPED | OUTPATIENT
Start: 2021-08-12 | End: 2021-12-01 | Stop reason: SDUPTHER

## 2021-09-16 LAB
CHOLESTEROL, TOTAL: 151 MG/DL (ref 0–199)
GLUCOSE BLD-MCNC: 97 MG/DL (ref 70–99)
HDLC SERPL-MCNC: 49 MG/DL (ref 40–60)
LDL CHOLESTEROL CALCULATED: 85 MG/DL
TRIGL SERPL-MCNC: 84 MG/DL (ref 0–150)

## 2021-11-03 ENCOUNTER — HOSPITAL ENCOUNTER (OUTPATIENT)
Dept: WOMENS IMAGING | Age: 41
Discharge: HOME | End: 2021-11-03
Payer: COMMERCIAL

## 2021-11-03 DIAGNOSIS — Z12.31 VISIT FOR SCREENING MAMMOGRAM: ICD-10-CM

## 2021-11-03 PROCEDURE — 77067 SCR MAMMO BI INCL CAD: CPT

## 2021-12-01 DIAGNOSIS — J30.2 SEASONAL ALLERGIES: ICD-10-CM

## 2021-12-01 RX ORDER — MONTELUKAST SODIUM 10 MG/1
TABLET ORAL
Qty: 90 TABLET | Refills: 3 | Status: SHIPPED | OUTPATIENT
Start: 2021-12-01

## 2021-12-08 ENCOUNTER — HOSPITAL ENCOUNTER (OUTPATIENT)
Dept: WOMENS IMAGING | Age: 41
Discharge: HOME | End: 2021-12-08
Payer: COMMERCIAL

## 2021-12-08 ENCOUNTER — APPOINTMENT (OUTPATIENT)
Dept: ULTRASOUND IMAGING | Age: 41
End: 2021-12-08
Payer: COMMERCIAL

## 2021-12-08 DIAGNOSIS — R92.8 ABNORMAL MAMMOGRAM: ICD-10-CM

## 2021-12-08 PROCEDURE — G0279 TOMOSYNTHESIS, MAMMO: HCPCS

## 2022-01-03 RX ORDER — ERYTHROMYCIN 5 MG/G
OINTMENT OPHTHALMIC
Qty: 1 EACH | Refills: 0 | Status: SHIPPED | OUTPATIENT
Start: 2022-01-03 | End: 2022-01-13

## 2022-03-01 DIAGNOSIS — R10.11 RUQ ABDOMINAL PAIN: Primary | ICD-10-CM

## 2022-03-04 ENCOUNTER — HOSPITAL ENCOUNTER (OUTPATIENT)
Dept: ULTRASOUND IMAGING | Age: 42
Discharge: HOME | End: 2022-03-04
Payer: COMMERCIAL

## 2022-03-04 DIAGNOSIS — R10.11 RUQ ABDOMINAL PAIN: ICD-10-CM

## 2022-03-04 PROCEDURE — 76705 ECHO EXAM OF ABDOMEN: CPT

## 2022-06-07 DIAGNOSIS — R92.8 ABNORMAL MAMMOGRAM: Primary | ICD-10-CM

## 2022-06-29 LAB
CHOLESTEROL, TOTAL: 147 MG/DL (ref 0–199)
GLUCOSE BLD-MCNC: 93 MG/DL (ref 70–99)
HDLC SERPL-MCNC: 35 MG/DL (ref 40–60)
LDL CHOLESTEROL CALCULATED: 96 MG/DL
TRIGL SERPL-MCNC: 82 MG/DL (ref 0–150)

## 2022-07-07 ENCOUNTER — APPOINTMENT (OUTPATIENT)
Dept: ULTRASOUND IMAGING | Age: 42
End: 2022-07-07
Payer: COMMERCIAL

## 2022-07-07 ENCOUNTER — HOSPITAL ENCOUNTER (OUTPATIENT)
Dept: WOMENS IMAGING | Age: 42
Discharge: HOME | End: 2022-07-07
Payer: COMMERCIAL

## 2022-07-07 DIAGNOSIS — R92.8 ABNORMAL MAMMOGRAM: ICD-10-CM

## 2022-07-07 PROCEDURE — G0279 TOMOSYNTHESIS, MAMMO: HCPCS

## 2022-09-28 ENCOUNTER — OFFICE VISIT (OUTPATIENT)
Dept: FAMILY MEDICINE CLINIC | Age: 42
End: 2022-09-28
Payer: COMMERCIAL

## 2022-09-28 VITALS
TEMPERATURE: 97.3 F | HEIGHT: 69 IN | OXYGEN SATURATION: 99 % | WEIGHT: 190.6 LBS | DIASTOLIC BLOOD PRESSURE: 78 MMHG | HEART RATE: 72 BPM | SYSTOLIC BLOOD PRESSURE: 110 MMHG | BODY MASS INDEX: 28.23 KG/M2

## 2022-09-28 DIAGNOSIS — Z23 NEED FOR INFLUENZA VACCINATION: ICD-10-CM

## 2022-09-28 DIAGNOSIS — Z00.00 ROUTINE ADULT HEALTH MAINTENANCE: Primary | ICD-10-CM

## 2022-09-28 PROCEDURE — 90471 IMMUNIZATION ADMIN: CPT | Performed by: FAMILY MEDICINE

## 2022-09-28 PROCEDURE — 90674 CCIIV4 VAC NO PRSV 0.5 ML IM: CPT | Performed by: FAMILY MEDICINE

## 2022-09-28 PROCEDURE — 99396 PREV VISIT EST AGE 40-64: CPT | Performed by: FAMILY MEDICINE

## 2022-09-28 RX ORDER — NORGESTIMATE AND ETHINYL ESTRADIOL 7DAYSX3 28
1 KIT ORAL
COMMUNITY
Start: 2021-09-30 | End: 2022-09-29

## 2022-09-28 SDOH — ECONOMIC STABILITY: FOOD INSECURITY: WITHIN THE PAST 12 MONTHS, THE FOOD YOU BOUGHT JUST DIDN'T LAST AND YOU DIDN'T HAVE MONEY TO GET MORE.: NEVER TRUE

## 2022-09-28 SDOH — ECONOMIC STABILITY: FOOD INSECURITY: WITHIN THE PAST 12 MONTHS, YOU WORRIED THAT YOUR FOOD WOULD RUN OUT BEFORE YOU GOT MONEY TO BUY MORE.: NEVER TRUE

## 2022-09-28 ASSESSMENT — PATIENT HEALTH QUESTIONNAIRE - PHQ9
SUM OF ALL RESPONSES TO PHQ QUESTIONS 1-9: 0
1. LITTLE INTEREST OR PLEASURE IN DOING THINGS: 0
SUM OF ALL RESPONSES TO PHQ QUESTIONS 1-9: 0
SUM OF ALL RESPONSES TO PHQ QUESTIONS 1-9: 0
2. FEELING DOWN, DEPRESSED OR HOPELESS: 0
SUM OF ALL RESPONSES TO PHQ9 QUESTIONS 1 & 2: 0
SUM OF ALL RESPONSES TO PHQ QUESTIONS 1-9: 0

## 2022-09-28 ASSESSMENT — SOCIAL DETERMINANTS OF HEALTH (SDOH): HOW HARD IS IT FOR YOU TO PAY FOR THE VERY BASICS LIKE FOOD, HOUSING, MEDICAL CARE, AND HEATING?: NOT HARD AT ALL

## 2022-09-28 NOTE — PROGRESS NOTES
Portions of this chart may have been created with voice recognition software. Occasional wrong-word or \"sound-alike\" substitutions may have occurred due to the inherent limitations of voice recognition software. Read the chart carefully and recognize, using context, where these substitutions have occurred      Chief Complaint     Annual Exam               SUBJECTIVE    Morgan Phalen is a 40 y.o. female here for routine adult health maintenance. Health Maintenance   Topic Date Due    Varicella vaccine (1 of 2 - 2-dose childhood series) Never done    Depression Screen  09/28/2023    Cervical cancer screen  07/06/2025    Lipids  06/29/2027    Flu vaccine  Completed    Pneumococcal 0-64 years Vaccine  Completed    COVID-19 Vaccine  Completed    Hepatitis C screen  Completed    HIV screen  Completed    Hepatitis A vaccine  Aged Out    Hepatitis B vaccine  Aged Out    Hib vaccine  Aged Out    Meningococcal (ACWY) vaccine  Aged Out              Patient Care Team:  Delisa Simmons MD as PCP - General (Family Medicine)  Delisa Simmons MD as PCP - [facility] Empaneled Provider  Oneida Baca MD as Consulting Physician (Cardiology)             Recommended Routine Annual Visits with Dermatologist, Dentists, Ophthalmologists and other care team providers as recommended. 1. Routine adult health maintenance  Age-appropriate preventive health maintenance recommendations discussed with her . Informed decisions were made to proceed with further evaluation as following.    - Ambulatory referral to Dermatology    2. Need for influenza vaccination    - Influenza, FLUCELVAX, (age 10 mo+), IM, Preservative Free, 0.5 mL                 REVIEW OF SYSTEMS:  CONSTITUTIONAL: No weight loss, fever, weakness or fatigue. HEENT:No sore throat, runny nose, earache. No vision or hearing disturbances   CARDIOVASCULAR: No chest pain,No palpitations or edema.   RESPIRATORY : No shortness of breath, cough.  GASTROINTESTINAL: No nausea, vomiting, diarrhea or constipation. No abdominal pain. GENITOURINARY:No dysuria, urgency, frequency, hematuria. NEUROLOGICAL: No headache, dizziness or syncope. MUSCULOSKELETAL: No muscle, back pain, joint pain or stiffness. PSYCHIATRIC : Positive for some occasional irritable mood secondary to hormonal contraceptives which she is getting removed and is currently under the care of gynecologist for the same. No worsening symptoms as far as any worsening mood changes suicidal thoughts or ideations. Patient would like to manage it conservatively at this time and call back if symptoms do not improve or worsen. SKIN: No rash or itching.       Lab Results   Component Value Date    WBC 9.3 05/19/2021    HGB 13.6 05/19/2021    HCT 39.7 05/19/2021    MCV 87.7 05/19/2021     05/19/2021      Lab Results   Component Value Date    LABA1C 5.0 07/28/2014     Lab Results   Component Value Date    EAG 96.8 07/28/2014     Lab Results   Component Value Date    TSH 8.03 (H) 11/14/2012     Lab Results   Component Value Date    CHOL 147 06/29/2022     Lab Results   Component Value Date    TRIG 82 06/29/2022     Lab Results   Component Value Date    HDL 35 (L) 06/29/2022     Lab Results   Component Value Date    LDLCALC 96 06/29/2022     Lab Results   Component Value Date    LABVLDL 17 10/16/2013     No results found for: [facility]   Lab Results   Component Value Date     09/14/2016    K 3.8 09/14/2016     09/14/2016    CO2 23 09/14/2016    BUN 12 09/14/2016    CREATININE 0.8 09/14/2016    GLUCOSE 93 06/29/2022    CALCIUM 9.0 09/14/2016    PROT 7.3 09/14/2016    LABALBU 4.0 09/14/2016    BILITOT 0.3 09/14/2016    ALKPHOS 51 09/14/2016    AST 14 (L) 09/14/2016    ALT 11 09/14/2016    LABGLOM >60 09/14/2016    GFRAA >60 09/14/2016    AGRATIO 1.2 09/14/2016    GLOB 3.3 09/14/2016           Current Outpatient Medications   Medication Sig Dispense Refill    Multiple Vitamin (MULTIVITAMIN ADULT PO) Take by mouth      Norgestim-Eth Estrad Triphasic 0.18/0.215/0.25 MG-35 MCG TABS 1 tablet      montelukast (SINGULAIR) 10 MG tablet TAKE 1 TABLET BY MOUTH ONE TIME A DAY 90 tablet 3    ibuprofen (ADVIL;MOTRIN) 800 MG tablet Take 1 tablet by mouth every 6 hours as needed for Pain 50 tablet 3    acetaminophen (TYLENOL) 500 MG tablet Take 1 tablet by mouth 4 times daily as needed for Pain 30 tablet 1    mometasone (ASMANEX, 120 METERED DOSES,) 220 MCG/INH inhaler Inhale 2 puffs into the lungs daily 3 Inhaler 3    albuterol sulfate HFA (PROAIR HFA) 108 (90 Base) MCG/ACT inhaler Inhale 2 puffs into the lungs every 6 hours as needed for Wheezing 1 Inhaler 3    cetirizine (ZYRTEC) 10 MG tablet Take 10 mg by mouth daily. No current facility-administered medications for this visit.        Allergies   Allergen Reactions    Latex     Adhesive Tape     Nickel     Tetanus Toxoids Swelling     Red/warm/swollen left deltoid after adacel 1/31/2011         Past Medical History:   Diagnosis Date    Allergic rhinitis     Asthma     Cervical dysplasia     Junior    COVID-19 virus infection 10/19/2020    Near syncope     Tachycardia     Varicella 1988         Past Surgical History:   Procedure Laterality Date    CERVICAL DISCECTOMY  3/31/14    C5-6, C6-7 ANTERIOR CERVICAL DISCECTOMY AND FUSION    EYE SURGERY      lasix    LEEP  11/11    [facility]    NECK SURGERY  2014    TONSILLECTOMY AND ADENOIDECTOMY Bilateral 2003         Family History   Problem Relation Age of Onset    High Blood Pressure Mother     Heart Disease Father         Social History     Socioeconomic History    Marital status:      Spouse name: None    Number of children: None    Years of education: None    Highest education level: None   Occupational History    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    Smoking status: Never    Smokeless tobacco: Never   Vaping Use    Vaping Use: Never used   Substance and Sexual Activity    Alcohol use: Not Currently     Comment: every other weekend    Drug use: No    Sexual activity: Yes     Partners: Male   Social History Narrative    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    Happily  with 1 kid--parents in town    Hobbies-reads,art-exercise--yoga     Social Determinants of Health     Financial Resource Strain: Low Risk     Difficulty of Paying Living Expenses: Not hard at all   Food Insecurity: No Food Insecurity    Worried About [address] in the Last Year: Never true    [facility] in the Last Year: Never true          OBJECTIVE:      Vitals:    09/28/22 0925   BP: 110/78   Pulse: 72   Temp: 97.3 °F (36.3 °C)   SpO2: 99%   Weight: 190 lb 9.6 oz (86.5 kg)   Height: 5' 9\" (1.753 m)       General appearance: alert, no distress, cooperative, appears stated age   Head: Normocephalic, without obvious abnormality, atraumatic  Eyes: conjunctivae/corneas clear. Pupils equal, round, reactive to light. Extraocular Movements intact. Ears: normal Tympanic Membranes and external ear canals bilaterally  Nose: Nares normal. Septum midline. Mucosa normal. No drainage or sinus tenderness. Throat: Lips, mucosa, and tongue normal. Teeth and gums normal  Neck: supple, symmetrical, trachea midline, no adenopathy, thyroid: not enlarged, symmetric, no tenderness/mass/nodules  Back: inspection of back is normal, no tenderness noted   Lungs: no acute distress, clear to auscultation bilaterally  Heart: regular rate and rhythm, S1, S2 normal, no murmur, click, rub or gallop   Abdomen: soft, non-tender. Bowel sounds normal. No masses, no organomegaly   Extremities: extremities normal, atraumatic, no cyanosis or edema  Pulses: pedal pulses intact  Skin: Skin color, texture, turgor normal. No rashes or lesions  Neurologic: Alert and oriented X 3. No focal neurological deficits. Normal coordination and gait.   Psychiatric: Patient has a normal mood and affect, behavior is normal. Judgment and thought content normal. ASSESSMENT AND PLAN    Roas Isela Farley was seen today for annual exam.    Diagnoses and all orders for this visit:    Routine adult health maintenance  -     Ambulatory referral to Dermatology    Need for influenza vaccination  -     Influenza, FLUCELVAX, (age 10 mo+), IM, Preservative Free, 0.5 mL           DISCHARGE MEDICATION LIST        Medication List            Accurate as of September 28, 2022  1:15 PM. If you have any questions, ask your nurse or doctor. CONTINUE taking these medications      acetaminophen 500 MG tablet  Commonly known as: TYLENOL  Take 1 tablet by mouth 4 times daily as needed for Pain     albuterol sulfate  (90 Base) MCG/ACT inhaler  Commonly known as: ProAir HFA  Inhale 2 puffs into the lungs every 6 hours as needed for Wheezing     cetirizine 10 MG tablet  Commonly known as: ZYRTEC     ibuprofen 800 MG tablet  Commonly known as: ADVIL;MOTRIN  Take 1 tablet by mouth every 6 hours as needed for Pain     mometasone 220 MCG/INH inhaler  Commonly known as: Asmanex (120 Metered Doses)  Inhale 2 puffs into the lungs daily     montelukast 10 MG tablet  Commonly known as: SINGULAIR  TAKE 1 TABLET BY MOUTH ONE TIME A DAY     MULTIVITAMIN ADULT PO     Norgestim-Eth Estrad Triphasic 0.18/0.215/0.25 MG-35 MCG Tabs               Return in about 1 year (around 9/28/2023), or if symptoms worsen or fail to improve. Please refer to diagnosis, orders and patient instructions for further recommendations given. Body mass index is 28.15 kg/m². . Goals of Healthy standard of living discussed. Emphasis given on appropriate diet and routine regular physical activity as much as tolerated advised. All patient's questions and concerns were addressed appropriately. All orders, handouts were reviewed in detail with the patient and patient voiced understanding verbally.

## 2022-10-04 ENCOUNTER — PATIENT MESSAGE (OUTPATIENT)
Dept: FAMILY MEDICINE CLINIC | Age: 42
End: 2022-10-04

## 2022-10-04 ENCOUNTER — OFFICE VISIT (OUTPATIENT)
Dept: FAMILY MEDICINE CLINIC | Age: 42
End: 2022-10-04
Payer: COMMERCIAL

## 2022-10-04 ENCOUNTER — TELEPHONE (OUTPATIENT)
Dept: FAMILY MEDICINE CLINIC | Age: 42
End: 2022-10-04

## 2022-10-04 VITALS
HEART RATE: 85 BPM | TEMPERATURE: 97.3 F | BODY MASS INDEX: 28.68 KG/M2 | DIASTOLIC BLOOD PRESSURE: 68 MMHG | WEIGHT: 193.6 LBS | SYSTOLIC BLOOD PRESSURE: 108 MMHG | HEIGHT: 69 IN | OXYGEN SATURATION: 99 %

## 2022-10-04 DIAGNOSIS — J01.90 ACUTE SINUSITIS, RECURRENCE NOT SPECIFIED, UNSPECIFIED LOCATION: Primary | ICD-10-CM

## 2022-10-04 DIAGNOSIS — J45.20 MILD INTERMITTENT ASTHMA WITHOUT COMPLICATION: ICD-10-CM

## 2022-10-04 DIAGNOSIS — J20.9 ACUTE BRONCHITIS, UNSPECIFIED ORGANISM: ICD-10-CM

## 2022-10-04 PROCEDURE — 99213 OFFICE O/P EST LOW 20 MIN: CPT | Performed by: FAMILY MEDICINE

## 2022-10-04 RX ORDER — CEPHALEXIN 500 MG/1
500 CAPSULE ORAL 2 TIMES DAILY
Qty: 20 CAPSULE | Refills: 0 | Status: SHIPPED | OUTPATIENT
Start: 2022-10-04 | End: 2022-10-14

## 2022-10-04 RX ORDER — AMOXICILLIN AND CLAVULANATE POTASSIUM 875; 125 MG/1; MG/1
1 TABLET, FILM COATED ORAL 2 TIMES DAILY
Qty: 20 TABLET | Refills: 0 | Status: SHIPPED | OUTPATIENT
Start: 2022-10-04 | End: 2022-10-14

## 2022-10-04 ASSESSMENT — PATIENT HEALTH QUESTIONNAIRE - PHQ9
SUM OF ALL RESPONSES TO PHQ QUESTIONS 1-9: 0
1. LITTLE INTEREST OR PLEASURE IN DOING THINGS: 0
SUM OF ALL RESPONSES TO PHQ9 QUESTIONS 1 & 2: 0
SUM OF ALL RESPONSES TO PHQ QUESTIONS 1-9: 0
2. FEELING DOWN, DEPRESSED OR HOPELESS: 0
SUM OF ALL RESPONSES TO PHQ QUESTIONS 1-9: 0
SUM OF ALL RESPONSES TO PHQ QUESTIONS 1-9: 0

## 2022-10-04 NOTE — TELEPHONE ENCOUNTER
Pt's Augmentin is completely out and on backorder. Pt is wondering if she can be prescribed anything else in the meantime. [address] [facility] [address], [address] [phone] - F [phone]    Pt would like to be notified if so through [employer].

## 2022-10-04 NOTE — PROGRESS NOTES
Portions of this chart may have been created with voice recognition software. Occasional wrong-word or \"sound-alike\" substitutions may have occurred due to the inherent limitations of voice recognition software. Read the chart carefully and recognize, using context, where these substitutions have occurred        Chief Complaint     URI (Onset Sunday, wet cough clear/yellow thick mucous, congestion, sore throat. Tested neg for covid last night)               MICHELLE Rowley is a 40 y.o. female here for the following concerns    Patient complains of symptoms of a URI, possible sinusitis. Symptoms include bilateral ear pain, congestion, coryza, cough, plugged sensation in both ears, and sore throat. Onset of  symptoms was a few days ago, gradually worsening since that time. She also c/o facial pain, nasal congestion, post nasal drip, productive cough with yellow green, and mucoid colored sputum, purulent nasal discharge, and sinus pressure for the past 1 week . No fever , chills or myalgias. She is drinking plenty of fluids. Evaluation to date: none. Treatment to date: oral decongestant, antihistamines, OTC cough suppressant, Acetaminophen, NSAID, which has been  somewhat effective. Been using her Asmanex as her symptoms had been under control previously. Recommended to start using her Asmanex again along with the Mucinex and pseudoephedrine as well as her other regular medications and inhalers as needed.          REVIEW OF SYSTEMS:    Pertinent Symptoms noted in HPI          Current Outpatient Medications   Medication Sig Dispense Refill    amoxicillin-clavulanate (AUGMENTIN) 875-125 MG per tablet Take 1 tablet by mouth 2 times daily for 10 days 20 tablet 0    Multiple Vitamin (MULTIVITAMIN ADULT PO) Take by mouth      montelukast (SINGULAIR) 10 MG tablet TAKE 1 TABLET BY MOUTH ONE TIME A DAY 90 tablet 3    ibuprofen (ADVIL;MOTRIN) 800 MG tablet Take 1 tablet by mouth every 6 hours as needed for Pain 50 tablet 3    acetaminophen (TYLENOL) 500 MG tablet Take 1 tablet by mouth 4 times daily as needed for Pain 30 tablet 1    mometasone (ASMANEX, 120 METERED DOSES,) 220 MCG/INH inhaler Inhale 2 puffs into the lungs daily 3 Inhaler 3    albuterol sulfate HFA (PROAIR HFA) 108 (90 Base) MCG/ACT inhaler Inhale 2 puffs into the lungs every 6 hours as needed for Wheezing 1 Inhaler 3    cetirizine (ZYRTEC) 10 MG tablet Take 10 mg by mouth daily. No current facility-administered medications for this visit.        Allergies   Allergen Reactions    Latex     Adhesive Tape     Nickel     Tetanus Toxoids Swelling     Red/warm/swollen left deltoid after adacel 1/31/2011         Past Medical History:   Diagnosis Date    Allergic rhinitis     Asthma     Cervical dysplasia     Junior    COVID-19 virus infection 10/19/2020    Near syncope     Tachycardia     Varicella 1988         Past Surgical History:   Procedure Laterality Date    CERVICAL DISCECTOMY  3/31/14    C5-6, C6-7 ANTERIOR CERVICAL DISCECTOMY AND FUSION    EYE SURGERY      lasix    LEEP  11/11    [facility]    NECK SURGERY  2014    TONSILLECTOMY AND ADENOIDECTOMY Bilateral 2003         Family History   Problem Relation Age of Onset    High Blood Pressure Mother     Heart Disease Father         Social History     Socioeconomic History    Marital status:      Spouse name: None    Number of children: None    Years of education: None    Highest education level: None   Occupational History    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    Smoking status: Never    Smokeless tobacco: Never   Vaping Use    Vaping Use: Never used   Substance and Sexual Activity    Alcohol use: Not Currently     Comment: every other weekend    Drug use: No    Sexual activity: Yes     Partners: Male   Social History Narrative    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 [employer]    Happily  with 1 kid--parents in [facility]     Social Determinants of [facility] Strain: Low Risk     Difficulty of Paying Living Expenses: Not hard at all   Food Insecurity: No Food Insecurity    Worried About [facility] [address] [employer] in the Last Year: Never true    Ran Out of Food in the Last Year: Never true          OBJECTIVE:      Vitals:    10/04/22 0951   BP: 108/68   Pulse: 85   Temp: 97.3 °F (36.3 °C)   SpO2: 99%   Weight: 193 lb 9.6 oz (87.8 kg)   Height: 5' 9\" (1.753 m)         Constitutional: Patient appears well-nourished, not in any distress. Head; normocephalic, atraumatic. LACEY. ENT- bilateral TM fluid noted, pharynx erythematous without exudate, maxillary and frontal sinus tender, and nasal mucosa pale and congested. Cardiovascular: Normal rate, regular rhythm, normal heart sounds and intact distal pulses. Pulmonary/Chest: Effort normal and breath sounds normal, no wheezes or rhonchi. Neurological:Patient is alert, oriented to person, place, and time. No obvious focal neurological deficits  Skin: Skin is warm and moist.  Psychiatric:Patient has a normal mood and affect, behavior is normal. Judgment and thought content normal.    ASSESSMENT AND PLAN    Shalom Kent was seen today for uri. Diagnoses and all orders for this visit:    Acute sinusitis, recurrence not specified, unspecified location    Acute bronchitis, unspecified organism    Mild intermittent asthma without complication    Other orders  -     amoxicillin-clavulanate (AUGMENTIN) 875-125 MG per tablet; Take 1 tablet by mouth 2 times daily for 10 days         Symptomatic therapy suggested: rest, increase fluids, gargle prn for sore throat, apply heat to sinuses prn, use mist of vaporizer prn, OTC acetaminophen, oral decongestants/mucolytics/cough suppressant as needed, Nsaids as needed, gradual reintroduction of usual activities. Call or return to clinic pm if these symptoms   worsen or fail to improve as anticipated.       DISCHARGE MEDICATION LIST        Medication List            Accurate as of October 4, 2022 10:04 AM. If you have any questions, ask your nurse or doctor. START taking these medications      amoxicillin-clavulanate 875-125 MG per tablet  Commonly known as: AUGMENTIN  Take 1 tablet by mouth 2 times daily for 10 days  Started by: Ko Aguilar MD            CONTINUE taking these medications      acetaminophen 500 MG tablet  Commonly known as: TYLENOL  Take 1 tablet by mouth 4 times daily as needed for Pain     albuterol sulfate  (90 Base) MCG/ACT inhaler  Commonly known as: ProAir HFA  Inhale 2 puffs into the lungs every 6 hours as needed for Wheezing     cetirizine 10 MG tablet  Commonly known as: ZYRTEC     ibuprofen 800 MG tablet  Commonly known as: ADVIL;MOTRIN  Take 1 tablet by mouth every 6 hours as needed for Pain     mometasone 220 MCG/INH inhaler  Commonly known as: Asmanex (120 Metered Doses)  Inhale 2 puffs into the lungs daily     montelukast 10 MG tablet  Commonly known as: SINGULAIR  TAKE 1 TABLET BY MOUTH ONE TIME A DAY     MULTIVITAMIN ADULT PO               Where to Get Your Medications        These medications were sent to [address] [place] [address], [address] [place] [address] & [facility]  [address] [address]      Phone: [phone]   amoxicillin-clavulanate 875-125 MG per tablet          Return if symptoms worsen or fail to improve. Please refer to diagnosis, orders and patient instructions for further recommendations given. All patient's questions and concerns were addressed appropriately. All orders, handouts were reviewed in detail with the patient and patient voiced understanding verbally.

## 2022-10-05 RX ORDER — FLUCONAZOLE 150 MG/1
150 TABLET ORAL ONCE
Qty: 1 TABLET | Refills: 0 | Status: SHIPPED | OUTPATIENT
Start: 2022-10-05 | End: 2022-10-05

## 2022-10-05 NOTE — TELEPHONE ENCOUNTER
From: Anjum MARTINEZ  To: Erasmo Solorzano  Sent: 10/4/2022 4:04 PM EDT  Subject: Alternative Medication    Good afternoon Dr. Cj Garcia has sent Keflex to the desired pharmacy. If you have any other issues please send us a message or give the office a call.      Morro Adjutant, [address] [facility]  [phone]

## 2022-10-14 ENCOUNTER — PATIENT MESSAGE (OUTPATIENT)
Dept: FAMILY MEDICINE CLINIC | Age: 42
End: 2022-10-14

## 2022-10-14 RX ORDER — FLUCONAZOLE 150 MG/1
150 TABLET ORAL ONCE
Qty: 1 TABLET | Refills: 0 | Status: SHIPPED | OUTPATIENT
Start: 2022-10-14 | End: 2022-10-14

## 2022-10-14 NOTE — TELEPHONE ENCOUNTER
From: Rosemary Hopkins  To: Dr. Rei Buenrostro: 10/14/2022 10:11 AM EDT  Subject: Medication    Hi Dr. Henri Olivo, sorry to bother you but Im almost finished with the keflex and feel I need another diflucan. If you could send it to [address] [place] [address],[address] today, that would be greatly appreciated!     Robby Echols

## 2022-12-08 DIAGNOSIS — J30.2 SEASONAL ALLERGIES: ICD-10-CM

## 2022-12-08 RX ORDER — MONTELUKAST SODIUM 10 MG/1
TABLET ORAL
Qty: 90 TABLET | Refills: 3 | Status: SHIPPED | OUTPATIENT
Start: 2022-12-08

## 2022-12-14 ENCOUNTER — OFFICE VISIT (OUTPATIENT)
Dept: ORTHOPEDIC SURGERY | Age: 42
End: 2022-12-14

## 2022-12-14 VITALS — HEIGHT: 69 IN | WEIGHT: 197 LBS | BODY MASS INDEX: 29.18 KG/M2

## 2022-12-14 DIAGNOSIS — M76.821 TIBIALIS POSTERIOR TENDINITIS, RIGHT: ICD-10-CM

## 2022-12-14 DIAGNOSIS — M21.41 PES PLANUS OF BOTH FEET: ICD-10-CM

## 2022-12-14 DIAGNOSIS — M25.571 RIGHT ANKLE PAIN, UNSPECIFIED CHRONICITY: Primary | ICD-10-CM

## 2022-12-14 DIAGNOSIS — M21.42 PES PLANUS OF BOTH FEET: ICD-10-CM

## 2022-12-14 DIAGNOSIS — M79.671 RIGHT FOOT PAIN: ICD-10-CM

## 2022-12-14 SDOH — HEALTH STABILITY: PHYSICAL HEALTH: ON AVERAGE, HOW MANY DAYS PER WEEK DO YOU ENGAGE IN MODERATE TO STRENUOUS EXERCISE (LIKE A BRISK WALK)?: 3 DAYS

## 2022-12-14 SDOH — HEALTH STABILITY: PHYSICAL HEALTH: ON AVERAGE, HOW MANY MINUTES DO YOU ENGAGE IN EXERCISE AT THIS LEVEL?: 20 MIN

## 2022-12-15 NOTE — PROGRESS NOTES
ORTHOPAEDIC NEW PATIENT NOTE    Chief Complaint   Patient presents with    New Patient     NP Rt Foot /Ankle       HPI  12/14/22  40 y.o. 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 Pain Assessment  Location Modifiers: Right, Medial, Superior  Severity of Pain: 6  Quality of Pain: Sharp, Cracking  Duration of Pain: A few hours  Frequency of Pain: Intermittent  Aggravating Factors: Squatting, Walking, Stairs, Standing  Limiting Behavior: Some  Relieving Factors: Ice, Heat, Nsaids  Result of Injury: No  Work-Related Injury: No  Are there other pain locations you wish to document?: No    Review of Systems  I have read over the ROS from the Patient History Form dated on 12/14/22  Pertinent positives include migraines, allergies, asthma history, peripheral edema  Rest of 13 point ROS otherwise negative except per HPI, and scanned into the patient's chart under the Media tab. Allergies   Allergen Reactions    Latex     Adhesive Tape     Nickel     Tetanus Toxoids Swelling     Red/warm/swollen left deltoid after adacel 1/31/2011        Current Outpatient Medications   Medication Sig Dispense Refill    Misc.  Devices MISC 1 each by Does not apply route once as needed (other) Custom molded orthotics 1 each 0    montelukast (SINGULAIR) 10 MG tablet TAKE 1 TABLET BY MOUTH ONE TIME A DAY 90 tablet 3    Multiple Vitamin (MULTIVITAMIN ADULT PO) Take by mouth      ibuprofen (ADVIL;MOTRIN) 800 MG tablet Take 1 tablet by mouth every 6 hours as needed for Pain 50 tablet 3    acetaminophen (TYLENOL) 500 MG tablet Take 1 tablet by mouth 4 times daily as needed for Pain 30 tablet 1    mometasone (ASMANEX, 120 METERED DOSES,) 220 MCG/INH inhaler Inhale 2 puffs into the lungs daily 3 Inhaler 3    albuterol sulfate HFA (PROAIR HFA) 108 (90 Base) MCG/ACT inhaler Inhale 2 puffs into the lungs every 6 hours as needed for Wheezing 1 Inhaler 3    cetirizine (ZYRTEC) 10 MG tablet Take 10 mg by mouth daily. No current facility-administered medications for this visit.        Past Medical History:   Diagnosis Date    Allergic rhinitis     Asthma     Cervical dysplasia     [facility]    COVID-19 virus infection 10/19/2020    Near syncope     Tachycardia     Varicella 1988        Past Surgical History:   Procedure Laterality Date    CERVICAL DISCECTOMY  3/31/14    C5-6, C6-7 ANTERIOR CERVICAL DISCECTOMY AND FUSION    EYE SURGERY      [facility]  11/11    [facility]    NECK SURGERY  2014    TONSILLECTOMY AND ADENOIDECTOMY Bilateral 2003       Family History   Problem Relation Age of Onset    High Blood Pressure Mother     Heart Disease Father        Social History     Socioeconomic History    Marital status:      Spouse name: Not on file    Number of children: Not on file    Years of education: Not on file    Highest education level: Not on file   Occupational History    Occupation: RN at [address] [place] [address] Drive Use    Smoking status: Never    Smokeless tobacco: Never   Vaping Use    Vaping Use: Never used   Substance and Sexual Activity    Alcohol use: Not Currently     Comment: every other weekend    Drug use: No    Sexual activity: Yes     Partners: Male   Other Topics Concern    Not on file   Social History Narrative    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    Happily  with 1 kid--parents in [facility]     Social Determinants of Health     Financial Resource Strain: Low Risk     Difficulty of Paying Living Expenses: Not hard at all   Food Insecurity: No Food Insecurity    Worried About [facility] in the Last Year: Never true    [address] [place] [address] Food in the Last Year: Never true   Transportation Needs: Not on file   Physical Activity: Insufficiently Active    Days of Exercise per Week: 3 days    Minutes of Exercise per Session: 20 min   Stress: Not on file   Social Connections: Not on file   Intimate Partner Violence: Not At Risk    Fear of Current or Ex-Partner: No    Emotionally Abused: No    Physically Abused: No    Sexually Abused: No   Housing Stability: Not on file        Vitals:    12/14/22 0938   Weight: 197 lb (89.4 kg)   Height: 5' 9\" (1.753 m)         Physical Exam  Constitutional - well-groomed, well-nourished, Body mass index is 29.09 kg/m². Psychiatric - pleasant, normal mood & affect  Cardiovascular - negative peripheral edema, dorsalis pedis pulse 2+  Skin - no rashes, wounds, or lesions seen on exposed skin  Neurological - BLE SILT SP/DP/T/sural/saphenous nerve distributions; EHL/FHL/TA/GS intact  Bilateral feet/ankles -   At rest/nonweightbearing, preserved medial longitudinal arch  With weightbearing, there is complete collapse of the medial longitudinal arch. Negative too many toes sign  Significant hindfoot valgus, right worse than left  Tenderness to palpation along the course of the right posterior tibialis tendon  Some swelling is seen over this region as well, no ecchymosis      Imaging:  Images were personally reviewed by myself and discussed with the patient  Right ankle 3 views performed 12/14/22 - no acute osseous abnormalities. Preserved tibiotalar articular height. Right ankle joint height lower than the left on these weightbearing views. On the weightbearing lateral view, there is metatarsal overlap consistent with flat foot deformity. Right foot 3 views performed 12/14/22 - no acute osseous abnormalities. Assessment & Plan:  40 y.o. female who presents with    Diagnosis Orders   1. Right ankle pain, unspecified chronicity  XR ANKLE RIGHT (MIN 3 VIEWS)    XR FOOT RIGHT (MIN 3 VIEWS)    Misc.  Devices MISC Airselect Tall Pneumatic Walking Boot      2. Right foot pain  Misc. Devices MISC    Airselect Tall Pneumatic Walking Boot      3. Pes planus of both feet - right worse than left  Misc. Devices MISC      4. Tibialis posterior tendinitis, right                Procedures    Airselect Tall Pneumatic Walking Boot     Patient was prescribed a Maridee Genta Tall Walking Boot. The right foot will require stabilization / immobilization from this semi-rigid / rigid orthosis to improve their function. The orthosis will assist in protecting the affected area, provide functional support and facilitate healing. Patient was instructed to progress ambulation weight bearing as tolerated in the device. The patient was educated and fit by a healthcare professional with expert knowledge and specialization in brace application while under the direct supervision of the physician. Verbal and written instructions for the use of and application of this item were provided. They were instructed to contact the office immediately should the brace result in increased pain, decreased sensation, increased swelling or worsening of the condition.          Pathoanatomy of current condition discussed  Flexible pes planus  Significant hindfoot valgus  Current medial ankle/hindfoot pain over posterior tibialis tendon, related to dysfunction  Walking boot  Ice, tylenol/NSAIDs PRN  Rx for custom insoles  If symptoms persist/worsen, I have recommended referral to Dr Isidro Heart foot/ankle surgeon for further options    Andrew Smith MD

## 2023-01-22 RX ORDER — CEPHALEXIN 500 MG/1
500 CAPSULE ORAL 3 TIMES DAILY
Qty: 21 CAPSULE | Refills: 0 | Status: SHIPPED | OUTPATIENT
Start: 2023-01-22 | End: 2023-01-29

## 2023-02-22 ENCOUNTER — HOSPITAL ENCOUNTER (OUTPATIENT)
Dept: WOMENS IMAGING | Age: 43
Discharge: HOME | End: 2023-02-22
Payer: COMMERCIAL

## 2023-02-22 DIAGNOSIS — Z12.31 BREAST CANCER SCREENING BY MAMMOGRAM: ICD-10-CM

## 2023-02-22 DIAGNOSIS — N63.10 MASS OF RIGHT BREAST, UNSPECIFIED QUADRANT: Primary | ICD-10-CM

## 2023-02-22 PROCEDURE — 77063 BREAST TOMOSYNTHESIS BI: CPT

## 2023-02-28 ENCOUNTER — HOSPITAL ENCOUNTER (OUTPATIENT)
Dept: ULTRASOUND IMAGING | Age: 43
Discharge: HOME | End: 2023-02-28
Payer: COMMERCIAL

## 2023-02-28 DIAGNOSIS — N63.10 MASS OF RIGHT BREAST, UNSPECIFIED QUADRANT: ICD-10-CM

## 2023-02-28 PROCEDURE — 76642 ULTRASOUND BREAST LIMITED: CPT

## 2023-03-10 ENCOUNTER — OFFICE VISIT (OUTPATIENT)
Dept: ORTHOPEDIC SURGERY | Age: 43
End: 2023-03-10

## 2023-03-10 DIAGNOSIS — M79.671 RIGHT FOOT PAIN: ICD-10-CM

## 2023-03-10 DIAGNOSIS — M76.821 POSTERIOR TIBIAL TENDON DYSFUNCTION (PTTD) OF RIGHT LOWER EXTREMITY: Primary | ICD-10-CM

## 2023-03-10 DIAGNOSIS — M25.571 RIGHT ANKLE PAIN, UNSPECIFIED CHRONICITY: ICD-10-CM

## 2023-03-10 RX ORDER — NAPROXEN 500 MG/1
500 TABLET ORAL 2 TIMES DAILY WITH MEALS
Qty: 60 TABLET | Refills: 0 | Status: SHIPPED | OUTPATIENT
Start: 2023-03-10 | End: 2023-04-09

## 2023-03-10 SDOH — HEALTH STABILITY: PHYSICAL HEALTH: ON AVERAGE, HOW MANY DAYS PER WEEK DO YOU ENGAGE IN MODERATE TO STRENUOUS EXERCISE (LIKE A BRISK WALK)?: 2 DAYS

## 2023-03-10 SDOH — HEALTH STABILITY: PHYSICAL HEALTH: ON AVERAGE, HOW MANY MINUTES DO YOU ENGAGE IN EXERCISE AT THIS LEVEL?: 30 MIN

## 2023-03-10 NOTE — PROGRESS NOTES
CHIEF COMPLAINT: Right posterior-medial ankle pain/posterior tibial tendenosis. HISTORY:  Ms. Muriel Garcia 44 y.o.  female presents today for the first visit for evaluation of right posterior-medial ankle pain which started Dec 2022. She saw Dr Javier Lei in Dec 2022. She is complaining of sharp pain, 7/10. Pain is increase with standing and walking and shoe wear. Pain is sharp with first few steps, dull achy pain by the end of the day. The pain radiates to medial leg, and no numbness and tingling sensation. No other complaint.   She works as RN at [employer].    Past Medical History:   Diagnosis Date    Allergic rhinitis     Asthma     Cervical dysplasia     [facility]    COVID-19 virus infection 10/19/2020    Near syncope     Tachycardia     Varicella 1988       Past Surgical History:   Procedure Laterality Date    CERVICAL DISCECTOMY  3/31/14    C5-6, C6-7 ANTERIOR CERVICAL DISCECTOMY AND FUSION    EYE SURGERY      lasix    LEEP  11/11    [facility]    NECK SURGERY  2014    TONSILLECTOMY AND ADENOIDECTOMY Bilateral 2003       Social History     Socioeconomic History    Marital status:      Spouse name: Not on file    Number of children: Not on file    Years of education: Not on file    Highest education level: Not on file   Occupational History    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    Smoking status: Never    Smokeless tobacco: Never   Vaping Use    Vaping Use: Never used   Substance and Sexual Activity    Alcohol use: Not Currently     Comment: every other weekend    Drug use: No    Sexual activity: Yes     Partners: Male   Other Topics Concern    Not on file   Social History Narrative    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    Happily  with 1 kid--parents in [facility]     Social Determinants of Health     Financial Resource Strain: Low Risk     Difficulty of Paying Living Expenses: Not hard at all   Food Insecurity: No Food Insecurity    Worried About [facility] in the Last Year: Never true    Ran Out of Food in the Last Year: Never true   Transportation Needs: Not on file   Physical Activity: Insufficiently Active    Days of Exercise per Week: 2 days    Minutes of Exercise per Session: 30 min   Stress: Not on file   Social Connections: Not on file   Intimate Partner Violence: Not At Risk    Fear of Current or Ex-Partner: No    Emotionally Abused: No    Physically Abused: No    Sexually Abused: No   Housing Stability: Not on file       Family History   Problem Relation Age of Onset    High Blood Pressure Mother     Heart Disease Father        Current Outpatient Medications on File Prior to Visit   Medication Sig Dispense Refill    montelukast (SINGULAIR) 10 MG tablet TAKE 1 TABLET BY MOUTH ONE TIME A DAY 90 tablet 3    Multiple Vitamin (MULTIVITAMIN ADULT PO) Take by mouth      ibuprofen (ADVIL;MOTRIN) 800 MG tablet Take 1 tablet by mouth every 6 hours as needed for Pain 50 tablet 3    acetaminophen (TYLENOL) 500 MG tablet Take 1 tablet by mouth 4 times daily as needed for Pain 30 tablet 1    mometasone (ASMANEX, 120 METERED DOSES,) 220 MCG/INH inhaler Inhale 2 puffs into the lungs daily 3 Inhaler 3    albuterol sulfate HFA (PROAIR HFA) 108 (90 Base) MCG/ACT inhaler Inhale 2 puffs into the lungs every 6 hours as needed for Wheezing 1 Inhaler 3    cetirizine (ZYRTEC) 10 MG tablet Take 10 mg by mouth daily. Misc. Devices MISC 1 each by Does not apply route once as needed (other) Custom molded orthotics 1 each 0     No current facility-administered medications on file prior to visit. Pertinent items are noted in HPI  Review of systems reviewed from Patient History Form and available in the patient's chart under the Media tab. No change noted. PHYSICAL EXAMINATION:  Ms. Duy Delgado is a very pleasant 44 y.o.  female who presents today in no acute distress, awake, alert, and oriented. She is well dressed, nourished and  groomed. Patient with normal affect. Height is   , weight is  , There is no height or weight on file to calculate BMI. Resting respiratory rate is 16. Examination of the gait, showed that the patient walks heel-toe with a minimal limp. Examination of both ankles showing dorsiflexion to about 5 degrees bilaterally, which increased with knee flexion. She has intact sensation and good pedal pulses. She has weak inversion, and has tenderness on deep palpation over the posterior tibial tendon with swelling compared to the other side. The ankles are stable to drawer test bilaterally, equally. She unable to perform single leg toe raise. IMAGING:Xray's were reviewed 3 views of the right foot and ankle taken in office today, and showed no acute fracture. Pes planus, with 20% of talar head uncovered. IMPRESSION: Right posterior tibial tendenosis. PLAN: I discussed with the patient all the treatment options, and natural history of PTTD. We recommended stretching exercises of the calf which was taught to the patient today. She will take NSAIDS Naprosyn. I believe she will benefit from PTTD brace, and that was applied in the office today and instructed her in care. We discussed the risk of progression. We will see her  back in 6 weeks and may consider UCBL. Procedures    Aircast Airlift PTTD Ankle Brace     Patient was prescribed an Aircast Airlift PTTD Brace. The right ankle will require stabilization / immobilization from this semi-rigid / rigid orthosis to improve their function. The orthosis will assist in protecting the affected area, provide functional support and facilitate healing. Patient was instructed to progress ambulation weight bearing as tolerated in the device. The patient was educated and fit by a healthcare professional with expert knowledge and specialization in brace application while under the direct supervision of the treating physician.   Verbal and written instructions for the use of and application of this item were provided. They were instructed to contact the office immediately should the brace result in increased pain, decreased sensation, increased swelling or worsening of the condition.      Katheryn Mckeon MD

## 2023-03-23 ENCOUNTER — OFFICE VISIT (OUTPATIENT)
Dept: FAMILY MEDICINE CLINIC | Age: 43
End: 2023-03-23
Payer: COMMERCIAL

## 2023-03-23 VITALS
SYSTOLIC BLOOD PRESSURE: 118 MMHG | OXYGEN SATURATION: 99 % | HEIGHT: 69 IN | WEIGHT: 190 LBS | DIASTOLIC BLOOD PRESSURE: 72 MMHG | HEART RATE: 95 BPM | BODY MASS INDEX: 28.14 KG/M2 | TEMPERATURE: 98.3 F

## 2023-03-23 DIAGNOSIS — J02.0 ACUTE STREPTOCOCCAL PHARYNGITIS: Primary | ICD-10-CM

## 2023-03-23 LAB — S PYO AG THROAT QL: POSITIVE

## 2023-03-23 PROCEDURE — 87880 STREP A ASSAY W/OPTIC: CPT | Performed by: NURSE PRACTITIONER

## 2023-03-23 PROCEDURE — 99214 OFFICE O/P EST MOD 30 MIN: CPT | Performed by: NURSE PRACTITIONER

## 2023-03-23 RX ORDER — AMOXICILLIN 500 MG/1
500 CAPSULE ORAL 2 TIMES DAILY
Qty: 14 CAPSULE | Refills: 0 | Status: SHIPPED | OUTPATIENT
Start: 2023-03-23 | End: 2023-03-30

## 2023-03-23 SDOH — ECONOMIC STABILITY: FOOD INSECURITY: WITHIN THE PAST 12 MONTHS, YOU WORRIED THAT YOUR FOOD WOULD RUN OUT BEFORE YOU GOT MONEY TO BUY MORE.: NEVER TRUE

## 2023-03-23 SDOH — ECONOMIC STABILITY: INCOME INSECURITY: HOW HARD IS IT FOR YOU TO PAY FOR THE VERY BASICS LIKE FOOD, HOUSING, MEDICAL CARE, AND HEATING?: NOT HARD AT ALL

## 2023-03-23 SDOH — ECONOMIC STABILITY: FOOD INSECURITY: WITHIN THE PAST 12 MONTHS, THE FOOD YOU BOUGHT JUST DIDN'T LAST AND YOU DIDN'T HAVE MONEY TO GET MORE.: NEVER TRUE

## 2023-03-23 SDOH — ECONOMIC STABILITY: HOUSING INSECURITY
IN THE LAST 12 MONTHS, WAS THERE A TIME WHEN YOU DID NOT HAVE A STEADY PLACE TO SLEEP OR SLEPT IN A SHELTER (INCLUDING NOW)?: NO

## 2023-03-23 ASSESSMENT — PATIENT HEALTH QUESTIONNAIRE - PHQ9
1. LITTLE INTEREST OR PLEASURE IN DOING THINGS: 0
SUM OF ALL RESPONSES TO PHQ9 QUESTIONS 1 & 2: 0
2. FEELING DOWN, DEPRESSED OR HOPELESS: 0
DEPRESSION UNABLE TO ASSESS: FUNCTIONAL CAPACITY MOTIVATION LIMITS ACCURACY
SUM OF ALL RESPONSES TO PHQ QUESTIONS 1-9: 0

## 2023-03-23 NOTE — PROGRESS NOTES
Byron Nguyen (:  1980) is a 44 y.o. female,Established patient, here for evaluation of the following chief complaint(s):  Pharyngitis (Has been present for ~2 days, ear pain)      ASSESSMENT/PLAN:  1. Acute streptococcal pharyngitis  Assessment & Plan:  Strep positive  Amoxicillin x7 days  Tylenol or ibuprofen for pain  Call if no better  Orders:  -     POCT rapid strep A    No follow-ups on file. SUBJECTIVE/OBJECTIVE:  Pharyngitis  This is a new problem. The current episode started yesterday. The problem occurs constantly. The problem has been gradually worsening. Associated symptoms include a fever, a sore throat and swollen glands. Pertinent negatives include no abdominal pain, arthralgias, chest pain, chills, congestion, coughing, fatigue, headaches, myalgias, rash or weakness. The symptoms are aggravated by swallowing. She has tried NSAIDs and acetaminophen for the symptoms. The treatment provided mild relief. Current Outpatient Medications   Medication Sig Dispense Refill    amoxicillin (AMOXIL) 500 MG capsule Take 1 capsule by mouth 2 times daily for 7 days 14 capsule 0    naproxen (NAPROSYN) 500 MG tablet Take 1 tablet by mouth 2 times daily (with meals) 60 tablet 0    montelukast (SINGULAIR) 10 MG tablet TAKE 1 TABLET BY MOUTH ONE TIME A DAY 90 tablet 3    Multiple Vitamin (MULTIVITAMIN ADULT PO) Take by mouth      ibuprofen (ADVIL;MOTRIN) 800 MG tablet Take 1 tablet by mouth every 6 hours as needed for Pain 50 tablet 3    acetaminophen (TYLENOL) 500 MG tablet Take 1 tablet by mouth 4 times daily as needed for Pain 30 tablet 1    mometasone (ASMANEX, 120 METERED DOSES,) 220 MCG/INH inhaler Inhale 2 puffs into the lungs daily 3 Inhaler 3    albuterol sulfate HFA (PROAIR HFA) 108 (90 Base) MCG/ACT inhaler Inhale 2 puffs into the lungs every 6 hours as needed for Wheezing 1 Inhaler 3    cetirizine (ZYRTEC) 10 MG tablet Take 10 mg by mouth daily. Misc.  Devices MISC 1 each by Does not

## 2023-03-24 PROBLEM — J02.0 ACUTE STREPTOCOCCAL PHARYNGITIS: Status: ACTIVE | Noted: 2023-03-24

## 2023-03-24 ASSESSMENT — ENCOUNTER SYMPTOMS
CONSTIPATION: 0
SORE THROAT: 1
BACK PAIN: 0
SWOLLEN GLANDS: 1
SINUS PAIN: 0
COLOR CHANGE: 0
SINUS PRESSURE: 0
SHORTNESS OF BREATH: 0
ABDOMINAL PAIN: 0
COUGH: 0
DIARRHEA: 0
WHEEZING: 0

## 2023-04-19 ENCOUNTER — OFFICE VISIT (OUTPATIENT)
Dept: ORTHOPEDIC SURGERY | Age: 43
End: 2023-04-19

## 2023-04-19 VITALS — WEIGHT: 190 LBS | HEIGHT: 69 IN | BODY MASS INDEX: 28.14 KG/M2

## 2023-04-19 DIAGNOSIS — M76.821 POSTERIOR TIBIAL TENDON DYSFUNCTION (PTTD) OF RIGHT LOWER EXTREMITY: Primary | ICD-10-CM

## 2023-04-19 NOTE — PROGRESS NOTES
of systems reviewed from Patient History Form and available in the patient's chart under the Media tab. No change noted. PHYSICAL EXAMINATION:  Ms. Johanny Nash is a very pleasant 44 y.o.  female who presents today in no acute distress, awake, alert, and oriented. She is well dressed, nourished and  groomed. Patient with normal affect. Height is  5' 9\" (1.753 m), weight is 190 lb (86.2 kg), Body mass index is 28.06 kg/m². Resting respiratory rate is 16. Examination of the gait, showed that the patient walks heel-toe with no limp. Examination of both ankles showing dorsiflexion to about 5 degrees bilaterally, which increased with knee flexion. She has intact sensation and good pedal pulses. She has weak inversion, and has tenderness on deep palpation over the posterior tibial tendon with swelling compared to the other side. The ankles are stable to drawer test bilaterally, equally. She is able to perform single leg toe raise. IMAGING:Xray's were reviewed 3 views of the right foot and ankle taken in office 3/10/2023, and showed no acute fracture. Pes planus, with 20% of talar head uncovered. IMPRESSION: Right posterior tibial tendenosis. PLAN: I discussed with the patient all the treatment options, and natural history of PTTD. We recommended stretching exercises of the calf which was taught to the patient today. She will take NSAIDS Naprosyn. I believe she will benefit from UCBL brace, Rx given and instructed her in care. We discussed the risk of progression. We will see her  back in 2 months after UCBL.        Cammy Booth MD

## 2023-05-30 RX ORDER — FLUCONAZOLE 150 MG/1
150 TABLET ORAL ONCE
Qty: 2 TABLET | Refills: 0 | Status: SHIPPED | OUTPATIENT
Start: 2023-05-30 | End: 2023-05-30

## 2023-06-09 ENCOUNTER — APPOINTMENT (OUTPATIENT)
Dept: GENERAL RADIOLOGY | Age: 43
End: 2023-06-09
Payer: OTHER MISCELLANEOUS

## 2023-06-09 ENCOUNTER — HOSPITAL ENCOUNTER (EMERGENCY)
Age: 43
Discharge: HOME | End: 2023-06-09
Payer: OTHER MISCELLANEOUS

## 2023-06-09 ENCOUNTER — APPOINTMENT (OUTPATIENT)
Dept: CT IMAGING | Age: 43
End: 2023-06-09
Payer: OTHER MISCELLANEOUS

## 2023-06-09 VITALS
OXYGEN SATURATION: 100 % | BODY MASS INDEX: 28.14 KG/M2 | HEART RATE: 93 BPM | TEMPERATURE: 98.3 F | HEIGHT: 69 IN | WEIGHT: 190 LBS | SYSTOLIC BLOOD PRESSURE: 142 MMHG | DIASTOLIC BLOOD PRESSURE: 91 MMHG | RESPIRATION RATE: 16 BRPM

## 2023-06-09 DIAGNOSIS — S00.81XA ABRASION OF CHIN, INITIAL ENCOUNTER: ICD-10-CM
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
## 2023-06-09 DIAGNOSIS — S60.512A HAND ABRASION, LEFT, INITIAL ENCOUNTER: ICD-10-CM

## 2023-06-09 DIAGNOSIS — S30.1XXA CONTUSION OF ABDOMINAL WALL, INITIAL ENCOUNTER: ICD-10-CM

## 2023-06-09 DIAGNOSIS — S20.211A CONTUSION OF RIGHT CHEST WALL, INITIAL ENCOUNTER: Primary | ICD-10-CM

## 2023-06-09 LAB
ANION GAP SERPL CALCULATED.3IONS-SCNC: 11 MMOL/L (ref 3–16)
BACTERIA URNS QL MICRO: ABNORMAL /HPF
BILIRUB UR QL STRIP.AUTO: NEGATIVE
BUN SERPL-MCNC: 13 MG/DL (ref 7–20)
CALCIUM SERPL-MCNC: 9.4 MG/DL (ref 8.3–10.6)
CHLORIDE SERPL-SCNC: 105 MMOL/L (ref 99–110)
CLARITY UR: ABNORMAL
CO2 SERPL-SCNC: 22 MMOL/L (ref 21–32)
COLOR UR: YELLOW
CREAT SERPL-MCNC: 0.7 MG/DL (ref 0.6–1.1)
EPI CELLS #/AREA URNS AUTO: 15 /HPF (ref 0–5)
GFR SERPLBLD CREATININE-BSD FMLA CKD-EPI: >60 ML/MIN/{1.73_M2}
GLUCOSE SERPL-MCNC: 105 MG/DL (ref 70–99)
GLUCOSE UR STRIP.AUTO-MCNC: NEGATIVE MG/DL
HCG UR QL: NEGATIVE
HGB UR QL STRIP.AUTO: NEGATIVE
HYALINE CASTS #/AREA URNS AUTO: 0 /LPF (ref 0–8)
KETONES UR STRIP.AUTO-MCNC: NEGATIVE MG/DL
LEUKOCYTE ESTERASE UR QL STRIP.AUTO: ABNORMAL
NITRITE UR QL STRIP.AUTO: NEGATIVE
PH UR STRIP.AUTO: 6 [PH] (ref 5–8)
POTASSIUM SERPL-SCNC: 4 MMOL/L (ref 3.5–5.1)
PROT UR STRIP.AUTO-MCNC: NEGATIVE MG/DL
RBC CLUMPS #/AREA URNS AUTO: 0 /HPF (ref 0–4)
SODIUM SERPL-SCNC: 138 MMOL/L (ref 136–145)
SP GR UR STRIP.AUTO: 1.01 (ref 1–1.03)
UA COMPLETE W REFLEX CULTURE PNL UR: YES
UA DIPSTICK W REFLEX MICRO PNL UR: YES
URN SPEC COLLECT METH UR: ABNORMAL
UROBILINOGEN UR STRIP-ACNC: 0.2 E.U./DL
WBC #/AREA URNS AUTO: 20 /HPF (ref 0–5)

## 2023-06-09 PROCEDURE — 73130 X-RAY EXAM OF HAND: CPT

## 2023-06-09 PROCEDURE — 87086 URINE CULTURE/COLONY COUNT: CPT

## 2023-06-09 PROCEDURE — 6360000004 HC RX CONTRAST MEDICATION: Performed by: PHYSICIAN ASSISTANT

## 2023-06-09 PROCEDURE — 84703 CHORIONIC GONADOTROPIN ASSAY: CPT

## 2023-06-09 PROCEDURE — 71260 CT THORAX DX C+: CPT

## 2023-06-09 PROCEDURE — 81001 URINALYSIS AUTO W/SCOPE: CPT

## 2023-06-09 PROCEDURE — 80048 BASIC METABOLIC PNL TOTAL CA: CPT

## 2023-06-09 PROCEDURE — 6370000000 HC RX 637 (ALT 250 FOR IP): Performed by: PHYSICIAN ASSISTANT

## 2023-06-09 RX ORDER — ACETAMINOPHEN 325 MG/1
325 TABLET ORAL ONCE
Status: COMPLETED | OUTPATIENT
Start: 2023-06-09 | End: 2023-06-09

## 2023-06-09 RX ORDER — IBUPROFEN 600 MG/1
600 TABLET ORAL EVERY 8 HOURS PRN
Qty: 20 TABLET | Refills: 0 | Status: SHIPPED | OUTPATIENT
Start: 2023-06-09

## 2023-06-09 RX ADMIN — ACETAMINOPHEN 325 MG: 325 TABLET ORAL at 12:17

## 2023-06-09 RX ADMIN — IOPAMIDOL 75 ML: 755 INJECTION, SOLUTION INTRAVENOUS at 13:21

## 2023-06-09 ASSESSMENT — PAIN - FUNCTIONAL ASSESSMENT: PAIN_FUNCTIONAL_ASSESSMENT: 0-10

## 2023-06-09 ASSESSMENT — ENCOUNTER SYMPTOMS
BACK PAIN: 0
STRIDOR: 0
CONSTIPATION: 0
VOMITING: 0
COUGH: 0
COLOR CHANGE: 0
WHEEZING: 0
NAUSEA: 0
SHORTNESS OF BREATH: 0
DIARRHEA: 0
ABDOMINAL PAIN: 1

## 2023-06-09 ASSESSMENT — LIFESTYLE VARIABLES: HOW OFTEN DO YOU HAVE A DRINK CONTAINING ALCOHOL: NEVER

## 2023-06-09 ASSESSMENT — PAIN SCALES - GENERAL: PAINLEVEL_OUTOF10: 4
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
## 2023-06-10 LAB — BACTERIA UR CULT: NORMAL

## 2023-06-18 NOTE — H&P
An intravascular ultrasound (IVUS) was performed in the left anterior descending artery using an (CATHETER US EE PLATINUM 3.3-2.9FR 20MM 150CM 24CM 20MHZ RX) ultrasound catheter. Department of Obstetrics and Gynecology   Obstetrics History and Physical        CHIEF COMPLAINT:  induction    HISTORY OF PRESENT ILLNESS:      The patient is a 37 y.o. female at 36w0d. OB History        2    Para   1    Term   1            AB        Living   1       SAB        TAB        Ectopic        Molar        Multiple   0    Live Births   1          Obstetric Comments   No med problems         Patient presents with a chief complaint as above and is being admitted for induction    Estimated Due Date: Estimated Date of Delivery: 21    PRENATAL CARE:    Complicated by: AMA, LGA, EFW between 8.5 to 9lbs    PAST OB HISTORY:  OB History        2    Para   1    Term   1            AB        Living   1       SAB        TAB        Ectopic        Molar        Multiple   0    Live Births   1          Obstetric Comments   No med problems             Past Medical History:        Diagnosis Date    Allergic rhinitis     Asthma     Cervical dysplasia     Nessa Laner    COVID-19 virus infection 10/19/2020    Near syncope     Tachycardia     Varicella 1988     Past Surgical History:        Procedure Laterality Date    CERVICAL DISCECTOMY  3/31/14    C5-6, C6-7 ANTERIOR CERVICAL DISCECTOMY AND FUSION    EYE SURGERY      lasix    LEEP      [facility] NECK SURGERY  2014    TONSILLECTOMY AND ADENOIDECTOMY Bilateral 2003     Allergies:  Latex, Adhesive tape, and Tetanus toxoids    Social History:    Social History     Socioeconomic History    Marital status:      Spouse name: Not on file    Number of children: Not on file    Years of education: Not on file    Highest education level: Not on file   Occupational History    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    Smoking status: Never Smoker    Smokeless tobacco: Never Used   Vaping Use    Vaping Use: Never used   Substance and Sexual Activity    Alcohol use: Not Currently     Comment: every other weekend    Drug use:  No  Sexual activity: Yes     Partners: Male   Other Topics Concern    Not on file   Social History Narrative    RN at [employer] in [facility]    Happily  with 1 kid--parents in town    [place]     Social Determinants of Health     Financial Resource Strain: Low Risk     Difficulty of Paying Living Expenses: Not hard at all   Food Insecurity: No Food Insecurity    Worried About [employer] [employer] in the Last Year: Never true    [employer] in the Last Year: Never true   Transportation Needs: No Transportation Needs    Lack of Transportation (Medical): No    Lack of Transportation (Non-Medical): No   Physical Activity:     Days of Exercise per Week:     Minutes of Exercise per Session:    Stress:     Feeling of Stress :    Social Connections:     Frequency of Communication with Friends and Family:     Frequency of Social Gatherings with Friends and Family:     Attends Zoroastrianism Services:     Active Member of Clubs or Organizations:     Attends Club or Organization Meetings:     Marital Status:    Intimate Partner Violence:     Fear of Current or Ex-Partner:     Emotionally Abused:     Physically Abused:     Sexually Abused:      Family History:       Problem Relation Age of Onset    High Blood Pressure Mother     Heart Disease Father      Medications Prior to Admission:  Medications Prior to Admission: montelukast (SINGULAIR) 10 MG tablet, TAKE 1 TABLET BY MOUTH ONE TIME A DAY  mometasone (ASMANEX, 120 METERED DOSES,) 220 MCG/INH inhaler, Inhale 2 puffs into the lungs daily  Prenatal Vit-DSS-Fe Cbn-FA (PRENATAL AD PO), Take by mouth  albuterol sulfate HFA (PROAIR HFA) 108 (90 Base) MCG/ACT inhaler, Inhale 2 puffs into the lungs every 6 hours as needed for Wheezing (Patient not taking: Reported on 1/13/2021)  cetirizine (ZYRTEC) 10 MG tablet, Take 10 mg by mouth daily.     REVIEW OF SYSTEMS:        PHYSICAL EXAM:  Vitals:    05/19/21 2242   Resp: 16   Temp: 98.2 °F (36.8 °C)   TempSrc: Oral   Weight: 201 lb (91.2 kg)   Height: 5' 9\" (1.753 m)     General appearance:  awake, alert, cooperative, no apparent distress, and appears stated age  Neurologic:  Awake, alert, oriented to name, place and time. Lungs:  No increased work of breathing, good air exchange  Abdomen:  Soft, non tender, gravid, consistent with her gestational age   Fetal heart rate:  Reassuring.   Pelvis:  Adequate pelvis  Cervix: 3cm/50/-3  Contraction frequency:      Membranes:  Intact    Labs:   CBC:   Lab Results   Component Value Date    WBC 9.3 05/19/2021    RBC 4.52 05/19/2021    HGB 13.6 05/19/2021    HCT 39.7 05/19/2021    MCV 87.7 05/19/2021    MCH 30.1 05/19/2021    MCHC 34.4 05/19/2021    RDW 13.6 05/19/2021     05/19/2021    MPV 8.7 05/19/2021       ASSESSMENT AND PLAN:    Labor: Admit, anticipate normal delivery, routine labor orders  Fetus: Reassuring  GBS: No  Other: pitocin

## 2023-08-21 ENCOUNTER — OFFICE VISIT (OUTPATIENT)
Dept: FAMILY MEDICINE CLINIC | Age: 43
End: 2023-08-21
Payer: COMMERCIAL

## 2023-08-21 VITALS
OXYGEN SATURATION: 94 % | DIASTOLIC BLOOD PRESSURE: 80 MMHG | HEART RATE: 82 BPM | BODY MASS INDEX: 29.68 KG/M2 | WEIGHT: 201 LBS | SYSTOLIC BLOOD PRESSURE: 122 MMHG | TEMPERATURE: 97.1 F

## 2023-08-21 DIAGNOSIS — F33.0 MILD EPISODE OF RECURRENT MAJOR DEPRESSIVE DISORDER (HCC): ICD-10-CM

## 2023-08-21 DIAGNOSIS — Z00.00 ENCOUNTER FOR WELL ADULT EXAM WITHOUT ABNORMAL FINDINGS: Primary | ICD-10-CM

## 2023-08-21 DIAGNOSIS — Z00.00 ROUTINE GENERAL MEDICAL EXAMINATION AT A HEALTH CARE FACILITY: ICD-10-CM

## 2023-08-21 PROCEDURE — 99213 OFFICE O/P EST LOW 20 MIN: CPT | Performed by: NURSE PRACTITIONER

## 2023-08-21 PROCEDURE — 99396 PREV VISIT EST AGE 40-64: CPT | Performed by: NURSE PRACTITIONER

## 2023-08-21 RX ORDER — PAROXETINE 10 MG/1
10 TABLET, FILM COATED ORAL DAILY
Qty: 30 TABLET | Refills: 3 | Status: SHIPPED | OUTPATIENT
Start: 2023-08-21 | End: 2023-08-22

## 2023-08-21 ASSESSMENT — ENCOUNTER SYMPTOMS
BLOOD IN STOOL: 0
CONSTIPATION: 0
BACK PAIN: 0
VOMITING: 0
ABDOMINAL PAIN: 0
DIARRHEA: 0
EYE ITCHING: 0
SINUS PRESSURE: 0
WHEEZING: 0
COUGH: 0
COLOR CHANGE: 0
RHINORRHEA: 0
SHORTNESS OF BREATH: 0
EYE REDNESS: 0
SORE THROAT: 0
CHEST TIGHTNESS: 0
NAUSEA: 0

## 2023-08-21 NOTE — ASSESSMENT & PLAN NOTE
Start paxil   Follow up 4 weeks  Call or return to clinic prn if these symptoms worsen or fail to improve as anticipated.

## 2023-08-22 RX ORDER — ESCITALOPRAM OXALATE 10 MG/1
10 TABLET ORAL DAILY
Qty: 30 TABLET | Refills: 3 | Status: SHIPPED | OUTPATIENT
Start: 2023-08-22

## 2023-09-01 DIAGNOSIS — R92.8 ABNORMAL MAMMOGRAM OF RIGHT BREAST: Primary | ICD-10-CM

## 2023-09-08 ENCOUNTER — HOSPITAL ENCOUNTER (OUTPATIENT)
Dept: ULTRASOUND IMAGING | Age: 43
Discharge: HOME | End: 2023-09-08
Payer: COMMERCIAL

## 2023-09-08 DIAGNOSIS — R92.8 ABNORMAL MAMMOGRAM OF RIGHT BREAST: ICD-10-CM

## 2023-09-08 PROCEDURE — 76642 ULTRASOUND BREAST LIMITED: CPT

## 2023-09-20 PROBLEM — Z00.00 ENCOUNTER FOR WELL ADULT EXAM WITHOUT ABNORMAL FINDINGS: Status: RESOLVED | Noted: 2020-12-07 | Resolved: 2023-09-20

## 2023-09-22 DIAGNOSIS — Z00.00 ROUTINE GENERAL MEDICAL EXAMINATION AT A HEALTH CARE FACILITY: ICD-10-CM

## 2023-09-22 LAB
ALBUMIN SERPL-MCNC: 4.3 G/DL (ref 3.4–5)
ALBUMIN/GLOB SERPL: 1.7 {RATIO} (ref 1.1–2.2)
ALP SERPL-CCNC: 74 U/L (ref 40–129)
ALT SERPL-CCNC: 17 U/L (ref 10–40)
ANION GAP SERPL CALCULATED.3IONS-SCNC: 10 MMOL/L (ref 3–16)
AST SERPL-CCNC: 17 U/L (ref 15–37)
BASOPHILS # BLD: 0 K/UL (ref 0–0.2)
BASOPHILS NFR BLD: 0 %
BILIRUB SERPL-MCNC: <0.2 MG/DL (ref 0–1)
BUN SERPL-MCNC: 14 MG/DL (ref 7–20)
CALCIUM SERPL-MCNC: 8.6 MG/DL (ref 8.3–10.6)
CHLORIDE SERPL-SCNC: 103 MMOL/L (ref 99–110)
CHOLEST SERPL-MCNC: 169 MG/DL (ref 0–199)
CO2 SERPL-SCNC: 25 MMOL/L (ref 21–32)
CREAT SERPL-MCNC: 0.7 MG/DL (ref 0.6–1.1)
DEPRECATED RDW RBC AUTO: 13.2 % (ref 12.4–15.4)
EOSINOPHIL # BLD: 0.5 K/UL (ref 0–0.6)
EOSINOPHIL NFR BLD: 8 %
GFR SERPLBLD CREATININE-BSD FMLA CKD-EPI: >60 ML/MIN/{1.73_M2}
GLUCOSE P FAST SERPL-MCNC: 99 MG/DL (ref 70–99)
HCT VFR BLD AUTO: 40.7 % (ref 36–48)
HDLC SERPL-MCNC: 52 MG/DL (ref 40–60)
HGB BLD-MCNC: 13.8 G/DL (ref 12–16)
LDL CHOLESTEROL CALCULATED: 104 MG/DL
LYMPHOCYTES # BLD: 2.3 K/UL (ref 1–5.1)
LYMPHOCYTES NFR BLD: 37 %
MCH RBC QN AUTO: 29 PG (ref 26–34)
MCHC RBC AUTO-ENTMCNC: 33.8 G/DL (ref 31–36)
MCV RBC AUTO: 85.8 FL (ref 80–100)
MONOCYTES # BLD: 0.2 K/UL (ref 0–1.3)
MONOCYTES NFR BLD: 3 %
NEUTROPHILS # BLD: 3.2 K/UL (ref 1.7–7.7)
NEUTROPHILS NFR BLD: 52 %
PLATELET # BLD AUTO: 261 K/UL (ref 135–450)
PLATELET BLD QL SMEAR: ADEQUATE
PMV BLD AUTO: 8.4 FL (ref 5–10.5)
POTASSIUM SERPL-SCNC: 4.1 MMOL/L (ref 3.5–5.1)
PROT SERPL-MCNC: 6.8 G/DL (ref 6.4–8.2)
RBC # BLD AUTO: 4.75 M/UL (ref 4–5.2)
RBC MORPH BLD: NORMAL
SLIDE REVIEW: ABNORMAL
SODIUM SERPL-SCNC: 138 MMOL/L (ref 136–145)
TOXIC GRANULES BLD QL SMEAR: PRESENT
TRIGL SERPL-MCNC: 66 MG/DL (ref 0–150)
VLDLC SERPL CALC-MCNC: 13 MG/DL
WBC # BLD AUTO: 6.2 K/UL (ref 4–11)

## 2023-09-28 LAB
HPV HR 12 DNA SPEC QL NAA+PROBE: NOT DETECTED
HPV16 DNA SPEC QL NAA+PROBE: NOT DETECTED
HPV16+18+H RISK 12 DNA SPEC-IMP: NORMAL
HPV18 DNA SPEC QL NAA+PROBE: NOT DETECTED

## 2023-10-17 ENCOUNTER — OFFICE VISIT (OUTPATIENT)
Dept: FAMILY MEDICINE CLINIC | Age: 43
End: 2023-10-17
Payer: COMMERCIAL

## 2023-10-17 VITALS
OXYGEN SATURATION: 99 % | SYSTOLIC BLOOD PRESSURE: 138 MMHG | BODY MASS INDEX: 29.95 KG/M2 | DIASTOLIC BLOOD PRESSURE: 86 MMHG | WEIGHT: 202.2 LBS | HEIGHT: 69 IN | HEART RATE: 82 BPM

## 2023-10-17 DIAGNOSIS — J30.2 SEASONAL ALLERGIES: ICD-10-CM

## 2023-10-17 DIAGNOSIS — F41.8 ANXIOUS DEPRESSION: ICD-10-CM

## 2023-10-17 DIAGNOSIS — J45.20 MILD INTERMITTENT ASTHMA WITHOUT COMPLICATION: ICD-10-CM

## 2023-10-17 DIAGNOSIS — Z23 NEED FOR PNEUMOCOCCAL VACCINE: Primary | ICD-10-CM

## 2023-10-17 PROBLEM — Z36.89 NST (NON-STRESS TEST) REACTIVE: Status: RESOLVED | Noted: 2021-04-24 | Resolved: 2023-10-17

## 2023-10-17 PROBLEM — O09.513 AMA (ADVANCED MATERNAL AGE) PRIMIGRAVIDA 35+, THIRD TRIMESTER: Status: RESOLVED | Noted: 2019-04-13 | Resolved: 2023-10-17

## 2023-10-17 PROBLEM — J02.0 ACUTE STREPTOCOCCAL PHARYNGITIS: Status: RESOLVED | Noted: 2023-03-24 | Resolved: 2023-10-17

## 2023-10-17 PROBLEM — O47.9 THREATENED LABOR: Status: RESOLVED | Noted: 2021-04-17 | Resolved: 2023-10-17

## 2023-10-17 PROBLEM — H57.89 IRRITATION OF LEFT EYE: Status: RESOLVED | Noted: 2021-01-13 | Resolved: 2023-10-17

## 2023-10-17 PROCEDURE — 90471 IMMUNIZATION ADMIN: CPT | Performed by: FAMILY MEDICINE

## 2023-10-17 PROCEDURE — 99213 OFFICE O/P EST LOW 20 MIN: CPT | Performed by: FAMILY MEDICINE

## 2023-10-17 PROCEDURE — 90677 PCV20 VACCINE IM: CPT | Performed by: FAMILY MEDICINE

## 2023-10-17 RX ORDER — ALBUTEROL SULFATE 90 UG/1
2 AEROSOL, METERED RESPIRATORY (INHALATION) EVERY 6 HOURS PRN
Qty: 1 EACH | Refills: 3 | Status: SHIPPED | OUTPATIENT
Start: 2023-10-17

## 2023-10-17 RX ORDER — MONTELUKAST SODIUM 10 MG/1
10 TABLET ORAL DAILY
Qty: 90 TABLET | Refills: 3 | Status: SHIPPED | OUTPATIENT
Start: 2023-10-17

## 2023-10-17 RX ORDER — ESCITALOPRAM OXALATE 10 MG/1
10 TABLET ORAL DAILY
Qty: 90 TABLET | Refills: 3 | Status: SHIPPED | OUTPATIENT
Start: 2023-10-17

## 2023-10-17 ASSESSMENT — ENCOUNTER SYMPTOMS
SORE THROAT: 0
CHEST TIGHTNESS: 1
COUGH: 1
WHEEZING: 1
HOARSE VOICE: 0
TROUBLE SWALLOWING: 0
HEMOPTYSIS: 0
FREQUENT THROAT CLEARING: 1
SHORTNESS OF BREATH: 0
DIFFICULTY BREATHING: 1
HEARTBURN: 0
SPUTUM PRODUCTION: 0
RHINORRHEA: 0

## 2023-10-17 NOTE — PROGRESS NOTES
Subjective:     Patient ID:Mckayla Mccann is a 44 y.o. female. Asthma  She complains of chest tightness, cough, difficulty breathing, frequent throat clearing and wheezing. There is no hemoptysis, hoarse voice, shortness of breath or sputum production. This is a recurrent problem. The current episode started more than 1 year ago. The problem occurs constantly. The problem has been waxing and waning. The cough is productive of sputum. Pertinent negatives include no appetite change, chest pain, dyspnea on exertion, ear congestion, ear pain, fever, headaches, heartburn, malaise/fatigue, myalgias, nasal congestion, orthopnea, PND, postnasal drip, rhinorrhea, sneezing, sore throat, sweats, trouble swallowing or weight loss. Her symptoms are aggravated by change in weather. Her symptoms are alleviated by nothing. She reports significant improvement on treatment. Her past medical history is significant for asthma. Allergies   Allergen Reactions    Latex     Adhesive Tape     Nickel     Tetanus Toxoids Swelling     Red/warm/swollen left deltoid after adacel 1/31/2011       Current Outpatient Medications   Medication Sig Dispense Refill    escitalopram (LEXAPRO) 10 MG tablet Take 1 tablet by mouth daily 90 tablet 3    albuterol sulfate HFA (PROAIR HFA) 108 (90 Base) MCG/ACT inhaler Inhale 2 puffs into the lungs every 6 hours as needed for Wheezing 1 each 3    mometasone (ASMANEX, 120 METERED DOSES,) 220 MCG/ACT AEPB inhaler Inhale 2 puffs into the lungs daily 3 each 3    montelukast (SINGULAIR) 10 MG tablet Take 1 tablet by mouth daily 90 tablet 3    ibuprofen (IBU) 600 MG tablet Take 1 tablet by mouth every 8 hours as needed for Pain 20 tablet 0    Misc.  Devices MISC 1 each by Does not apply route once as needed (other) Custom molded orthotics 1 each 0    Multiple Vitamin (MULTIVITAMIN ADULT PO) Take by mouth      cetirizine (ZYRTEC) 10 MG tablet Take 1 tablet by mouth daily       No current facility-administered unknown

## 2023-10-24 NOTE — PROGRESS NOTES
Name_______________________________________Printed:____________________  Date and time of surgery___10/30/2023________1200_____________Arrival Time:___1000_____________   1. The instructions given regarding when and if a patient needs to stop oral intake prior to surgery varies. Follow the specific instructions you were given                  __x_Nothing to eat or to drink after Midnight the night before.                   ____Carbo loading or instructions will be given to select patients-if you have been given those instructions -please do the following                           The evening before your surgery after dinner before midnight drink 40 ounces of gatorade. If you are diabetic use sugar free. The morning of surgery drink 40 ounces of water. This needs to be finished 3 hours prior to your surgery start time. 2. Take the following pills with a small sip of water on the morning of surgery__symbicort_________________________________________________                  Do not take blood pressure medications ending in pril or sartan the miguel angel prior to surgery or the morning of surgery. Dr Flower Turner patient are not to take any medications the AM of surgery. 3. Aspirin, Ibuprofen, Advil, Naproxen, Vitamin E and other Anti-inflammatory products and supplements should be stopped for 5 -7days before surgery or as directed by your physician. 4. Check with your Doctor regarding stopping Plavix, Coumadin,Eliquis, Lovenox,Effient,Pradaxa,Xarelto, Fragmin or other blood thinners and follow their instructions. 5. Do not smoke, and do not drink any alcoholic beverages 24 hours prior to surgery. This includes NA Beer. Refrain from the usage of any recreational drugs. 6. You may brush your teeth and gargle the morning of surgery. DO NOT SWALLOW WATER   7. You MUST make arrangements for a responsible adult to stay on site while you are here and take you home after your surgery.  You will not be allowed to leave alone

## 2023-10-26 ENCOUNTER — HOSPITAL ENCOUNTER (OUTPATIENT)
Dept: GENERAL RADIOLOGY | Age: 43
Discharge: HOME | End: 2023-10-26
Payer: COMMERCIAL

## 2023-10-26 DIAGNOSIS — R05.1 ACUTE COUGH: ICD-10-CM

## 2023-10-26 DIAGNOSIS — R05.1 ACUTE COUGH: Primary | ICD-10-CM

## 2023-10-26 PROCEDURE — 71046 X-RAY EXAM CHEST 2 VIEWS: CPT

## 2023-10-30 ENCOUNTER — ANESTHESIA EVENT (OUTPATIENT)
Dept: OPERATING ROOM | Age: 43
End: 2023-10-30
Payer: COMMERCIAL

## 2023-10-30 ENCOUNTER — ANESTHESIA (OUTPATIENT)
Dept: OPERATING ROOM | Age: 43
End: 2023-10-30
Payer: COMMERCIAL

## 2023-10-30 ENCOUNTER — HOSPITAL ENCOUNTER (OUTPATIENT)
Age: 43
Setting detail: OUTPATIENT SURGERY
Discharge: HOME | End: 2023-10-30
Attending: OBSTETRICS & GYNECOLOGY | Admitting: OBSTETRICS & GYNECOLOGY
Payer: COMMERCIAL

## 2023-10-30 VITALS
OXYGEN SATURATION: 98 % | SYSTOLIC BLOOD PRESSURE: 111 MMHG | BODY MASS INDEX: 29.53 KG/M2 | WEIGHT: 200 LBS | TEMPERATURE: 97.2 F | DIASTOLIC BLOOD PRESSURE: 76 MMHG | HEART RATE: 89 BPM | RESPIRATION RATE: 21 BRPM

## 2023-10-30 LAB
ABO + RH BLD: NORMAL
BASOPHILS # BLD: 0.1 K/UL (ref 0–0.2)
BASOPHILS NFR BLD: 0.8 %
BLD GP AB SCN SERPL QL: NORMAL
DEPRECATED RDW RBC AUTO: 13.3 % (ref 12.4–15.4)
EOSINOPHIL # BLD: 0.4 K/UL (ref 0–0.6)
EOSINOPHIL NFR BLD: 6 %
HCG UR QL: NEGATIVE
HCT VFR BLD AUTO: 41.3 % (ref 36–48)
HGB BLD-MCNC: 13.9 G/DL (ref 12–16)
LYMPHOCYTES # BLD: 2 K/UL (ref 1–5.1)
LYMPHOCYTES NFR BLD: 28.3 %
MCH RBC QN AUTO: 28 PG (ref 26–34)
MCHC RBC AUTO-ENTMCNC: 33.7 G/DL (ref 31–36)
MCV RBC AUTO: 83 FL (ref 80–100)
MONOCYTES # BLD: 0.3 K/UL (ref 0–1.3)
MONOCYTES NFR BLD: 4 %
NEUTROPHILS # BLD: 4.4 K/UL (ref 1.7–7.7)
NEUTROPHILS NFR BLD: 60.9 %
PLATELET # BLD AUTO: 259 K/UL (ref 135–450)
PLATELET BLD QL SMEAR: ADEQUATE
PMV BLD AUTO: 7.8 FL (ref 5–10.5)
RBC # BLD AUTO: 4.98 M/UL (ref 4–5.2)
RBC MORPH BLD: NORMAL
SLIDE REVIEW: NORMAL
WBC # BLD AUTO: 7.2 K/UL (ref 4–11)

## 2023-10-30 PROCEDURE — 2720000010 HC SURG SUPPLY STERILE: Performed by: OBSTETRICS & GYNECOLOGY

## 2023-10-30 PROCEDURE — 6360000002 HC RX W HCPCS

## 2023-10-30 PROCEDURE — 7100000010 HC PHASE II RECOVERY - FIRST 15 MIN: Performed by: OBSTETRICS & GYNECOLOGY

## 2023-10-30 PROCEDURE — 86850 RBC ANTIBODY SCREEN: CPT

## 2023-10-30 PROCEDURE — 3600000004 HC SURGERY LEVEL 4 BASE: Performed by: OBSTETRICS & GYNECOLOGY

## 2023-10-30 PROCEDURE — 7100000000 HC PACU RECOVERY - FIRST 15 MIN: Performed by: OBSTETRICS & GYNECOLOGY

## 2023-10-30 PROCEDURE — 3700000001 HC ADD 15 MINUTES (ANESTHESIA): Performed by: OBSTETRICS & GYNECOLOGY

## 2023-10-30 PROCEDURE — 7100000001 HC PACU RECOVERY - ADDTL 15 MIN: Performed by: OBSTETRICS & GYNECOLOGY

## 2023-10-30 PROCEDURE — 84703 CHORIONIC GONADOTROPIN ASSAY: CPT

## 2023-10-30 PROCEDURE — 7100000011 HC PHASE II RECOVERY - ADDTL 15 MIN: Performed by: OBSTETRICS & GYNECOLOGY

## 2023-10-30 PROCEDURE — 2709999900 HC NON-CHARGEABLE SUPPLY: Performed by: OBSTETRICS & GYNECOLOGY

## 2023-10-30 PROCEDURE — 88305 TISSUE EXAM BY PATHOLOGIST: CPT

## 2023-10-30 PROCEDURE — 2500000003 HC RX 250 WO HCPCS

## 2023-10-30 PROCEDURE — 2580000003 HC RX 258: Performed by: OBSTETRICS & GYNECOLOGY

## 2023-10-30 PROCEDURE — 6360000002 HC RX W HCPCS: Performed by: OBSTETRICS & GYNECOLOGY

## 2023-10-30 PROCEDURE — 3600000014 HC SURGERY LEVEL 4 ADDTL 15MIN: Performed by: OBSTETRICS & GYNECOLOGY

## 2023-10-30 PROCEDURE — C1886 CATHETER, ABLATION: HCPCS | Performed by: OBSTETRICS & GYNECOLOGY

## 2023-10-30 PROCEDURE — 6360000002 HC RX W HCPCS: Performed by: ANESTHESIOLOGY

## 2023-10-30 PROCEDURE — 85025 COMPLETE CBC W/AUTO DIFF WBC: CPT

## 2023-10-30 PROCEDURE — 86900 BLOOD TYPING SEROLOGIC ABO: CPT

## 2023-10-30 PROCEDURE — 3700000000 HC ANESTHESIA ATTENDED CARE: Performed by: OBSTETRICS & GYNECOLOGY

## 2023-10-30 PROCEDURE — 86901 BLOOD TYPING SEROLOGIC RH(D): CPT

## 2023-10-30 PROCEDURE — 36415 COLL VENOUS BLD VENIPUNCTURE: CPT

## 2023-10-30 RX ORDER — ONDANSETRON 2 MG/ML
4 INJECTION INTRAMUSCULAR; INTRAVENOUS
Status: DISCONTINUED | OUTPATIENT
Start: 2023-10-30 | End: 2023-10-30 | Stop reason: HOSPADM

## 2023-10-30 RX ORDER — ONDANSETRON 2 MG/ML
INJECTION INTRAMUSCULAR; INTRAVENOUS PRN
Status: DISCONTINUED | OUTPATIENT
Start: 2023-10-30 | End: 2023-10-30 | Stop reason: SDUPTHER

## 2023-10-30 RX ORDER — MIDAZOLAM HYDROCHLORIDE 1 MG/ML
INJECTION INTRAMUSCULAR; INTRAVENOUS PRN
Status: DISCONTINUED | OUTPATIENT
Start: 2023-10-30 | End: 2023-10-30 | Stop reason: SDUPTHER

## 2023-10-30 RX ORDER — SODIUM CHLORIDE 0.9 % (FLUSH) 0.9 %
5-40 SYRINGE (ML) INJECTION EVERY 12 HOURS SCHEDULED
Status: DISCONTINUED | OUTPATIENT
Start: 2023-10-30 | End: 2023-10-30 | Stop reason: HOSPADM

## 2023-10-30 RX ORDER — EPHEDRINE SULFATE/0.9% NACL/PF 50 MG/5 ML
SYRINGE (ML) INTRAVENOUS PRN
Status: DISCONTINUED | OUTPATIENT
Start: 2023-10-30 | End: 2023-10-30 | Stop reason: SDUPTHER

## 2023-10-30 RX ORDER — MEPERIDINE HYDROCHLORIDE 25 MG/ML
12.5 INJECTION INTRAMUSCULAR; INTRAVENOUS; SUBCUTANEOUS EVERY 5 MIN PRN
Status: DISCONTINUED | OUTPATIENT
Start: 2023-10-30 | End: 2023-10-30 | Stop reason: HOSPADM

## 2023-10-30 RX ORDER — HYDROMORPHONE HYDROCHLORIDE 2 MG/ML
0.5 INJECTION, SOLUTION INTRAMUSCULAR; INTRAVENOUS; SUBCUTANEOUS EVERY 5 MIN PRN
Status: DISCONTINUED | OUTPATIENT
Start: 2023-10-30 | End: 2023-10-30 | Stop reason: HOSPADM

## 2023-10-30 RX ORDER — MAGNESIUM HYDROXIDE 1200 MG/15ML
LIQUID ORAL CONTINUOUS PRN
Status: COMPLETED | OUTPATIENT
Start: 2023-10-30 | End: 2023-10-30

## 2023-10-30 RX ORDER — KETOROLAC TROMETHAMINE 30 MG/ML
INJECTION, SOLUTION INTRAMUSCULAR; INTRAVENOUS PRN
Status: DISCONTINUED | OUTPATIENT
Start: 2023-10-30 | End: 2023-10-30 | Stop reason: SDUPTHER

## 2023-10-30 RX ORDER — DEXAMETHASONE SODIUM PHOSPHATE 4 MG/ML
INJECTION, SOLUTION INTRA-ARTICULAR; INTRALESIONAL; INTRAMUSCULAR; INTRAVENOUS; SOFT TISSUE PRN
Status: DISCONTINUED | OUTPATIENT
Start: 2023-10-30 | End: 2023-10-30 | Stop reason: SDUPTHER

## 2023-10-30 RX ORDER — LIDOCAINE HYDROCHLORIDE 20 MG/ML
INJECTION, SOLUTION EPIDURAL; INFILTRATION; INTRACAUDAL; PERINEURAL PRN
Status: DISCONTINUED | OUTPATIENT
Start: 2023-10-30 | End: 2023-10-30 | Stop reason: SDUPTHER

## 2023-10-30 RX ORDER — SODIUM CHLORIDE 0.9 % (FLUSH) 0.9 %
5-40 SYRINGE (ML) INJECTION PRN
Status: DISCONTINUED | OUTPATIENT
Start: 2023-10-30 | End: 2023-10-30 | Stop reason: HOSPADM

## 2023-10-30 RX ORDER — PROPOFOL 10 MG/ML
INJECTION, EMULSION INTRAVENOUS PRN
Status: DISCONTINUED | OUTPATIENT
Start: 2023-10-30 | End: 2023-10-30 | Stop reason: SDUPTHER

## 2023-10-30 RX ORDER — LIDOCAINE HYDROCHLORIDE 10 MG/ML
0.5 INJECTION, SOLUTION EPIDURAL; INFILTRATION; INTRACAUDAL; PERINEURAL ONCE
Status: DISCONTINUED | OUTPATIENT
Start: 2023-10-30 | End: 2023-10-30 | Stop reason: HOSPADM

## 2023-10-30 RX ORDER — FENTANYL CITRATE 50 UG/ML
INJECTION, SOLUTION INTRAMUSCULAR; INTRAVENOUS PRN
Status: DISCONTINUED | OUTPATIENT
Start: 2023-10-30 | End: 2023-10-30 | Stop reason: SDUPTHER

## 2023-10-30 RX ORDER — SODIUM CHLORIDE 9 MG/ML
INJECTION, SOLUTION INTRAVENOUS PRN
Status: DISCONTINUED | OUTPATIENT
Start: 2023-10-30 | End: 2023-10-30 | Stop reason: HOSPADM

## 2023-10-30 RX ORDER — SODIUM CHLORIDE, SODIUM LACTATE, POTASSIUM CHLORIDE, CALCIUM CHLORIDE 600; 310; 30; 20 MG/100ML; MG/100ML; MG/100ML; MG/100ML
INJECTION, SOLUTION INTRAVENOUS CONTINUOUS
Status: DISCONTINUED | OUTPATIENT
Start: 2023-10-30 | End: 2023-10-30 | Stop reason: HOSPADM

## 2023-10-30 RX ADMIN — DEXAMETHASONE SODIUM PHOSPHATE 8 MG: 4 INJECTION, SOLUTION INTRAMUSCULAR; INTRAVENOUS at 12:20

## 2023-10-30 RX ADMIN — SODIUM CHLORIDE, POTASSIUM CHLORIDE, SODIUM LACTATE AND CALCIUM CHLORIDE: 600; 310; 30; 20 INJECTION, SOLUTION INTRAVENOUS at 11:58

## 2023-10-30 RX ADMIN — LIDOCAINE HYDROCHLORIDE 100 MG: 20 INJECTION, SOLUTION EPIDURAL; INFILTRATION; INTRACAUDAL; PERINEURAL at 12:03

## 2023-10-30 RX ADMIN — KETOROLAC TROMETHAMINE 15 MG: 60 INJECTION, SOLUTION INTRAMUSCULAR at 13:07

## 2023-10-30 RX ADMIN — Medication 10 MG: at 12:38

## 2023-10-30 RX ADMIN — Medication 10 MG: at 12:51

## 2023-10-30 RX ADMIN — ONDANSETRON 4 MG: 2 INJECTION INTRAMUSCULAR; INTRAVENOUS at 12:21

## 2023-10-30 RX ADMIN — MIDAZOLAM 1 MG: 1 INJECTION INTRAMUSCULAR; INTRAVENOUS at 11:58

## 2023-10-30 RX ADMIN — PROPOFOL 150 MG: 10 INJECTION, EMULSION INTRAVENOUS at 12:03

## 2023-10-30 RX ADMIN — Medication 10 MG: at 12:31

## 2023-10-30 RX ADMIN — HYDROMORPHONE HYDROCHLORIDE 0.5 MG: 2 INJECTION, SOLUTION INTRAMUSCULAR; INTRAVENOUS; SUBCUTANEOUS at 14:08

## 2023-10-30 RX ADMIN — PROPOFOL 50 MG: 10 INJECTION, EMULSION INTRAVENOUS at 13:03

## 2023-10-30 RX ADMIN — FENTANYL CITRATE 50 MCG: 50 INJECTION, SOLUTION INTRAMUSCULAR; INTRAVENOUS at 12:07

## 2023-10-30 RX ADMIN — CEFAZOLIN 2000 MG: 2 INJECTION, POWDER, FOR SOLUTION INTRAMUSCULAR; INTRAVENOUS at 11:55

## 2023-10-30 RX ADMIN — MIDAZOLAM 1 MG: 1 INJECTION INTRAMUSCULAR; INTRAVENOUS at 12:01

## 2023-10-30 ASSESSMENT — PAIN - FUNCTIONAL ASSESSMENT: PAIN_FUNCTIONAL_ASSESSMENT: NONE - DENIES PAIN

## 2023-10-30 ASSESSMENT — PAIN DESCRIPTION - DESCRIPTORS: DESCRIPTORS: CRAMPING

## 2023-10-30 ASSESSMENT — PAIN SCALES - GENERAL: PAINLEVEL_OUTOF10: 7

## 2023-10-30 ASSESSMENT — PAIN DESCRIPTION - LOCATION: LOCATION: ABDOMEN

## 2023-10-30 NOTE — PROGRESS NOTES
Patient admitted to PACU via stretcher, unresponsive, respirations adeq on 2L o2 per NC spo2 98%. Skin warm and dry with good color. Abd soft. Peripad in place with no drainage. Will continue to monitor.

## 2023-10-30 NOTE — PROGRESS NOTES
Discharge instructions reviewed with pt and . Pt and  v/u. Pt denies questions or concerns. Pt to discharge home with family.

## 2023-10-30 NOTE — PROGRESS NOTES
Phase 1 discharge criteria met. VSS, O2 sats 98% on RA. Theodora pad with minimal bloody drainage. Pt reports pain is tolerable.   Pt will transfer to phase II care in stable condition

## 2023-10-30 NOTE — ANESTHESIA POSTPROCEDURE EVALUATION
Department of Anesthesiology  Postprocedure Note    Patient: Tra Painter  MRN: [id number]  YOB: 1980  Date of evaluation: 10/30/2023      Procedure Summary     Date: 10/30/23 Room / Location: [address]    Anesthesia Start: 8229 Anesthesia Stop: 1316    Procedure: HYSTEROSCOPY DILATATION AND CURETTAGE AND unsuccessful ENDOMETRIAL ABLATION (Vagina ) Diagnosis:       Menorrhagia with regular cycle      Abnormal uterine bleeding      Other mechanical complication of intrauterine contraceptive device, initial encounter      (Menorrhagia with regular cycle [N92.0])      (Abnormal uterine bleeding [N93.9])      (Other mechanical complication of intrauterine contraceptive device, initial encounter Bekah Kline)    Surgeons: Tonja Dos Santos MD Responsible Provider: Mounika Rousseau MD    Anesthesia Type: General ASA Status: 2          Anesthesia Type: General    Nicole Phase I: Nicole Score: 5    Nicole Phase II:        Anesthesia Post Evaluation    Patient location during evaluation: PACU  Patient participation: complete - patient participated  Level of consciousness: awake  Airway patency: patent  Nausea & Vomiting: no vomiting and no nausea  Complications: no  Cardiovascular status: hemodynamically stable  Respiratory status: acceptable  Hydration status: stable  Multimodal analgesia pain management approach  Pain management: adequate

## 2023-10-30 NOTE — OP NOTE
Operative Note      Patient: Marcus Ogden  YOB: 1980  MRN: [id number]    Date of Procedure: 10/30/2023    Pre-Op Diagnosis Codes:     * Menorrhagia with regular cycle [N92.0]     * Abnormal uterine bleeding [N93.9]     * Other mechanical complication of intrauterine contraceptive device, initial encounter Cyrus Mann    Post-Op Diagnosis: Same       Procedure:  D and C, hscope, unsuccessful attempt at endometrial ablation(novasure and HTA)    Surgeon(s):  Gume Ashraf MD    Assistant:   * No surgical staff found *    Anesthesia: Monitor Anesthesia Care    Estimated Blood Loss (mL): Minimal    Complications: None    Specimens:   ID Type Source Tests Collected by Time Destination   A : A) Endometrial Curettings Tissue Tissue SURGICAL PATHOLOGY Gume Ashraf MD 10/30/2023 1300        Implants:  * No implants in log *      Drains: * No LDAs found *    Findings: overall normal endometrial cavity, few fluffy endometrial tissue        Detailed Description of Procedure:   Pt was taken to OR and placed under anesthesia. She was prepped and draped in the usual sterile fashion in dorsal lithotomy position. Cx was grasped with a tenaculum and gradully dilated. Dx hscope was performed revealing a normal cavity. Sharp curettage of the cavity was performed and sent to pathology. Novasure endometrial device was inserted and length set at 6cm and width at 4.1cm. Unfortunately the seal test could not be passed despite using two different devices and applying vaseline gauze at the os. HTA device was then called and attempted. Again due to widely dilated multip cervix the water was leaking and ablation could not be performed. All the instruments were removed and pt was taken to the RR. She received 2gm of Ancef. She tolerated the procedure well.     Electronically signed by Gume Ashraf MD on 10/30/2023 at 1:02 PM

## 2023-10-30 NOTE — H&P
Date of Surgery Update:  Edith Acevedo was seen, history and physical examination reviewed, and patient examined by me today. There have been no significant clinical changes since the completion of the previous history and physical.    The risk, benefits, and alternatives of the proposed procedure have been explained to the patient (or appropriate guardian) and understanding verbalized. All questions answered. Patient wishes to proceed.     Electronically signed by: Renay Portillo MD,10/30/2023,11:49 AM

## 2023-11-10 NOTE — PROGRESS NOTES
Name_______________________________________Printed:____________________  Date and time of surgery__11/17/23 @ 1030______________________Arrival Time:___0900 main hosp_____________   1. The instructions given regarding when and if a patient needs to stop oral intake prior to surgery varies. Follow the specific instructions you were given                  _x__Nothing to eat or to drink after Midnight the night before.                   ____Carbo loading or instructions will be given to select patients-if you have been given those instructions -please do the following                           The evening before your surgery after dinner before midnight drink 40 ounces of gatorade. If you are diabetic use sugar free. The morning of surgery drink 40 ounces of water. This needs to be finished 3 hours prior to your surgery start time. 2. Take the following pills with a small sip of water on the morning of surgery_____bring inhalers______________________________________________                  Do not take blood pressure medications ending in pril or sartan the miguel angel prior to surgery or the morning of surgery. Dr Caprice Garcia patient are not to take any medications the AM of surgery. 3. Aspirin, Ibuprofen, Advil, Naproxen, Vitamin E and other Anti-inflammatory products and supplements should be stopped for 5 -7days before surgery or as directed by your physician. 4. Check with your Doctor regarding stopping Plavix, Coumadin,Eliquis, Lovenox,Effient,Pradaxa,Xarelto, Fragmin or other blood thinners and follow their instructions. 5. Do not smoke, and do not drink any alcoholic beverages 24 hours prior to surgery. This includes NA Beer. Refrain from the usage of any recreational drugs. 6. You may brush your teeth and gargle the morning of surgery. DO NOT SWALLOW WATER   7. You MUST make arrangements for a responsible adult to stay on site while you are here and take you home after your surgery.  You will not be Please bring picture ID and insurance card. 19.  Visit our web site for additional information:  [email or web address]              20.During flu season no children under the age of 15 are permitted in the hospital for the safety of all patients. 21. If you take a long acting insulin in the evening only  take half of your usual  dose the night  before your procedure              22. If you use a c-pap please bring DOS if staying overnight,             23.For your convenience [facility] has a pharmacy on site to fill your prescriptions. 24. If you use oxygen and have a portable tank please bring it  with you the DOS             25. Bring a complete list of all your medications with name and dose include any supplements. 26. Other__________________________________________   *Please call pre admission testing if you any further questions   [facility]  [address] [facility]  [phone]   [address] [place] [address]       VISITOR POLICY(subject to change)    Current policy is 2 visitors per patient. No children. Mask is  at the discretion of the facility. Visiting hours are 8a-8p. Overnight visitors will be at the discretion of the nurse. All policies subject to change. All above information reviewed with patient in person or by phone. Patient verbalizes understanding. All questions and concerns addressed.                                                                                                  Patient/Rep______pt______________                                                                                                                                    PRE OP INSTRUCTIONS

## 2023-11-16 ENCOUNTER — ANESTHESIA EVENT (OUTPATIENT)
Dept: OPERATING ROOM | Age: 43
End: 2023-11-16
Payer: COMMERCIAL

## 2023-11-17 ENCOUNTER — HOSPITAL ENCOUNTER (OUTPATIENT)
Age: 43
Setting detail: SURGERY ADMIT
Discharge: HOME | End: 2023-11-17
Attending: OBSTETRICS & GYNECOLOGY | Admitting: OBSTETRICS & GYNECOLOGY
Payer: COMMERCIAL

## 2023-11-17 ENCOUNTER — ANESTHESIA (OUTPATIENT)
Dept: OPERATING ROOM | Age: 43
End: 2023-11-17
Payer: COMMERCIAL

## 2023-11-17 VITALS
DIASTOLIC BLOOD PRESSURE: 82 MMHG | TEMPERATURE: 98.2 F | RESPIRATION RATE: 14 BRPM | HEIGHT: 69 IN | BODY MASS INDEX: 30.17 KG/M2 | OXYGEN SATURATION: 96 % | HEART RATE: 98 BPM | SYSTOLIC BLOOD PRESSURE: 110 MMHG | WEIGHT: 203.71 LBS

## 2023-11-17 DIAGNOSIS — G89.18 POST-OP PAIN: Primary | ICD-10-CM

## 2023-11-17 DIAGNOSIS — N92.0 MENORRHAGIA WITH REGULAR CYCLE: ICD-10-CM

## 2023-11-17 DIAGNOSIS — T83.89XA: ICD-10-CM

## 2023-11-17 LAB
ABO + RH BLD: NORMAL
ANION GAP SERPL CALCULATED.3IONS-SCNC: 9 MMOL/L (ref 3–16)
BASOPHILS # BLD: 0 K/UL (ref 0–0.2)
BASOPHILS NFR BLD: 0.6 %
BLD GP AB SCN SERPL QL: NORMAL
BUN SERPL-MCNC: 11 MG/DL (ref 7–20)
CALCIUM SERPL-MCNC: 8.6 MG/DL (ref 8.3–10.6)
CHLORIDE SERPL-SCNC: 108 MMOL/L (ref 99–110)
CO2 SERPL-SCNC: 22 MMOL/L (ref 21–32)
CREAT SERPL-MCNC: 0.6 MG/DL (ref 0.6–1.1)
DEPRECATED RDW RBC AUTO: 13.7 % (ref 12.4–15.4)
DEPRECATED RDW RBC AUTO: 13.9 % (ref 12.4–15.4)
EOSINOPHIL # BLD: 0.2 K/UL (ref 0–0.6)
EOSINOPHIL NFR BLD: 4.1 %
GFR SERPLBLD CREATININE-BSD FMLA CKD-EPI: >60 ML/MIN/{1.73_M2}
GLUCOSE SERPL-MCNC: 107 MG/DL (ref 70–99)
HCG UR QL: NEGATIVE
HCT VFR BLD AUTO: 37 % (ref 36–48)
HCT VFR BLD AUTO: 37.9 % (ref 36–48)
HGB BLD-MCNC: 12.4 G/DL (ref 12–16)
HGB BLD-MCNC: 12.7 G/DL (ref 12–16)
LYMPHOCYTES # BLD: 1.8 K/UL (ref 1–5.1)
LYMPHOCYTES NFR BLD: 44.5 %
MCH RBC QN AUTO: 27.8 PG (ref 26–34)
MCH RBC QN AUTO: 28.1 PG (ref 26–34)
MCHC RBC AUTO-ENTMCNC: 33.4 G/DL (ref 31–36)
MCHC RBC AUTO-ENTMCNC: 33.4 G/DL (ref 31–36)
MCV RBC AUTO: 83.2 FL (ref 80–100)
MCV RBC AUTO: 84.1 FL (ref 80–100)
MONOCYTES # BLD: 0.4 K/UL (ref 0–1.3)
MONOCYTES NFR BLD: 9.2 %
NEUTROPHILS # BLD: 1.7 K/UL (ref 1.7–7.7)
NEUTROPHILS NFR BLD: 41.6 %
PLATELET # BLD AUTO: 204 K/UL (ref 135–450)
PLATELET # BLD AUTO: 235 K/UL (ref 135–450)
PMV BLD AUTO: 7.9 FL (ref 5–10.5)
PMV BLD AUTO: 8 FL (ref 5–10.5)
POTASSIUM SERPL-SCNC: 3.9 MMOL/L (ref 3.5–5.1)
RBC # BLD AUTO: 4.4 M/UL (ref 4–5.2)
RBC # BLD AUTO: 4.56 M/UL (ref 4–5.2)
SODIUM SERPL-SCNC: 139 MMOL/L (ref 136–145)
WBC # BLD AUTO: 4 K/UL (ref 4–11)
WBC # BLD AUTO: 7.4 K/UL (ref 4–11)

## 2023-11-17 PROCEDURE — 36415 COLL VENOUS BLD VENIPUNCTURE: CPT

## 2023-11-17 PROCEDURE — A4217 STERILE WATER/SALINE, 500 ML: HCPCS | Performed by: OBSTETRICS & GYNECOLOGY

## 2023-11-17 PROCEDURE — 3600000005 HC SURGERY LEVEL 5 BASE: Performed by: OBSTETRICS & GYNECOLOGY

## 2023-11-17 PROCEDURE — 6360000002 HC RX W HCPCS: Performed by: OBSTETRICS & GYNECOLOGY

## 2023-11-17 PROCEDURE — 3600000015 HC SURGERY LEVEL 5 ADDTL 15MIN: Performed by: OBSTETRICS & GYNECOLOGY

## 2023-11-17 PROCEDURE — 2709999900 HC NON-CHARGEABLE SUPPLY: Performed by: OBSTETRICS & GYNECOLOGY

## 2023-11-17 PROCEDURE — 86901 BLOOD TYPING SEROLOGIC RH(D): CPT

## 2023-11-17 PROCEDURE — 2500000003 HC RX 250 WO HCPCS

## 2023-11-17 PROCEDURE — 6360000002 HC RX W HCPCS: Performed by: ANESTHESIOLOGY

## 2023-11-17 PROCEDURE — 88307 TISSUE EXAM BY PATHOLOGIST: CPT

## 2023-11-17 PROCEDURE — 2580000003 HC RX 258: Performed by: OBSTETRICS & GYNECOLOGY

## 2023-11-17 PROCEDURE — 3700000000 HC ANESTHESIA ATTENDED CARE: Performed by: OBSTETRICS & GYNECOLOGY

## 2023-11-17 PROCEDURE — 3700000001 HC ADD 15 MINUTES (ANESTHESIA): Performed by: OBSTETRICS & GYNECOLOGY

## 2023-11-17 PROCEDURE — 7100000011 HC PHASE II RECOVERY - ADDTL 15 MIN: Performed by: OBSTETRICS & GYNECOLOGY

## 2023-11-17 PROCEDURE — 85025 COMPLETE CBC W/AUTO DIFF WBC: CPT

## 2023-11-17 PROCEDURE — 7100000001 HC PACU RECOVERY - ADDTL 15 MIN: Performed by: OBSTETRICS & GYNECOLOGY

## 2023-11-17 PROCEDURE — 6360000002 HC RX W HCPCS

## 2023-11-17 PROCEDURE — 2720000010 HC SURG SUPPLY STERILE: Performed by: OBSTETRICS & GYNECOLOGY

## 2023-11-17 PROCEDURE — 7100000010 HC PHASE II RECOVERY - FIRST 15 MIN: Performed by: OBSTETRICS & GYNECOLOGY

## 2023-11-17 PROCEDURE — 80048 BASIC METABOLIC PNL TOTAL CA: CPT

## 2023-11-17 PROCEDURE — 7100000000 HC PACU RECOVERY - FIRST 15 MIN: Performed by: OBSTETRICS & GYNECOLOGY

## 2023-11-17 PROCEDURE — 85027 COMPLETE CBC AUTOMATED: CPT

## 2023-11-17 PROCEDURE — 6370000000 HC RX 637 (ALT 250 FOR IP): Performed by: ANESTHESIOLOGY

## 2023-11-17 PROCEDURE — 86900 BLOOD TYPING SEROLOGIC ABO: CPT

## 2023-11-17 PROCEDURE — 84703 CHORIONIC GONADOTROPIN ASSAY: CPT

## 2023-11-17 PROCEDURE — 6370000000 HC RX 637 (ALT 250 FOR IP): Performed by: OBSTETRICS & GYNECOLOGY

## 2023-11-17 PROCEDURE — 86850 RBC ANTIBODY SCREEN: CPT

## 2023-11-17 RX ORDER — VASOPRESSIN 20 U/ML
INJECTION PARENTERAL
Status: COMPLETED | OUTPATIENT
Start: 2023-11-17 | End: 2023-11-17

## 2023-11-17 RX ORDER — SODIUM CHLORIDE 0.9 % (FLUSH) 0.9 %
5-40 SYRINGE (ML) INJECTION EVERY 12 HOURS SCHEDULED
Status: DISCONTINUED | OUTPATIENT
Start: 2023-11-17 | End: 2023-11-17 | Stop reason: HOSPADM

## 2023-11-17 RX ORDER — SODIUM CHLORIDE 0.9 % (FLUSH) 0.9 %
5-40 SYRINGE (ML) INJECTION PRN
Status: DISCONTINUED | OUTPATIENT
Start: 2023-11-17 | End: 2023-11-17 | Stop reason: HOSPADM

## 2023-11-17 RX ORDER — FENTANYL CITRATE 50 UG/ML
25 INJECTION, SOLUTION INTRAMUSCULAR; INTRAVENOUS EVERY 5 MIN PRN
Status: DISCONTINUED | OUTPATIENT
Start: 2023-11-17 | End: 2023-11-17 | Stop reason: HOSPADM

## 2023-11-17 RX ORDER — KETOROLAC TROMETHAMINE 30 MG/ML
15 INJECTION, SOLUTION INTRAMUSCULAR; INTRAVENOUS ONCE
Status: COMPLETED | OUTPATIENT
Start: 2023-11-17 | End: 2023-11-17

## 2023-11-17 RX ORDER — BUPIVACAINE HYDROCHLORIDE 5 MG/ML
INJECTION, SOLUTION EPIDURAL; INTRACAUDAL
Status: COMPLETED | OUTPATIENT
Start: 2023-11-17 | End: 2023-11-17

## 2023-11-17 RX ORDER — ONDANSETRON 2 MG/ML
INJECTION INTRAMUSCULAR; INTRAVENOUS PRN
Status: DISCONTINUED | OUTPATIENT
Start: 2023-11-17 | End: 2023-11-17 | Stop reason: SDUPTHER

## 2023-11-17 RX ORDER — MAGNESIUM HYDROXIDE 1200 MG/15ML
LIQUID ORAL CONTINUOUS PRN
Status: COMPLETED | OUTPATIENT
Start: 2023-11-17 | End: 2023-11-17

## 2023-11-17 RX ORDER — FENTANYL CITRATE 50 UG/ML
INJECTION, SOLUTION INTRAMUSCULAR; INTRAVENOUS PRN
Status: DISCONTINUED | OUTPATIENT
Start: 2023-11-17 | End: 2023-11-17 | Stop reason: SDUPTHER

## 2023-11-17 RX ORDER — PROCHLORPERAZINE EDISYLATE 5 MG/ML
5 INJECTION INTRAMUSCULAR; INTRAVENOUS
Status: DISCONTINUED | OUTPATIENT
Start: 2023-11-17 | End: 2023-11-17 | Stop reason: HOSPADM

## 2023-11-17 RX ORDER — OXYCODONE HYDROCHLORIDE AND ACETAMINOPHEN 5; 325 MG/1; MG/1
1-2 TABLET ORAL EVERY 6 HOURS PRN
Qty: 25 TABLET | Refills: 0 | Status: SHIPPED | OUTPATIENT
Start: 2023-11-17 | End: 2023-11-22

## 2023-11-17 RX ORDER — HYDROMORPHONE HYDROCHLORIDE 2 MG/ML
0.5 INJECTION, SOLUTION INTRAMUSCULAR; INTRAVENOUS; SUBCUTANEOUS EVERY 5 MIN PRN
Status: DISCONTINUED | OUTPATIENT
Start: 2023-11-17 | End: 2023-11-17 | Stop reason: HOSPADM

## 2023-11-17 RX ORDER — OXYCODONE HYDROCHLORIDE 5 MG/1
5 TABLET ORAL ONCE
Status: COMPLETED | OUTPATIENT
Start: 2023-11-17 | End: 2023-11-17

## 2023-11-17 RX ORDER — LIDOCAINE HYDROCHLORIDE 10 MG/ML
0.5 INJECTION, SOLUTION EPIDURAL; INFILTRATION; INTRACAUDAL; PERINEURAL ONCE
Status: DISCONTINUED | OUTPATIENT
Start: 2023-11-17 | End: 2023-11-17 | Stop reason: HOSPADM

## 2023-11-17 RX ORDER — OXYCODONE HYDROCHLORIDE 5 MG/1
5 TABLET ORAL
Status: COMPLETED | OUTPATIENT
Start: 2023-11-17 | End: 2023-11-17

## 2023-11-17 RX ORDER — ONDANSETRON 2 MG/ML
4 INJECTION INTRAMUSCULAR; INTRAVENOUS
Status: DISCONTINUED | OUTPATIENT
Start: 2023-11-17 | End: 2023-11-17 | Stop reason: HOSPADM

## 2023-11-17 RX ORDER — PHENYLEPHRINE HCL IN 0.9% NACL 1 MG/10 ML
SYRINGE (ML) INTRAVENOUS PRN
Status: DISCONTINUED | OUTPATIENT
Start: 2023-11-17 | End: 2023-11-17 | Stop reason: SDUPTHER

## 2023-11-17 RX ORDER — DEXAMETHASONE SODIUM PHOSPHATE 4 MG/ML
INJECTION, SOLUTION INTRA-ARTICULAR; INTRALESIONAL; INTRAMUSCULAR; INTRAVENOUS; SOFT TISSUE PRN
Status: DISCONTINUED | OUTPATIENT
Start: 2023-11-17 | End: 2023-11-17 | Stop reason: SDUPTHER

## 2023-11-17 RX ORDER — LIDOCAINE HYDROCHLORIDE 10 MG/ML
1 INJECTION, SOLUTION EPIDURAL; INFILTRATION; INTRACAUDAL; PERINEURAL
Status: DISCONTINUED | OUTPATIENT
Start: 2023-11-17 | End: 2023-11-17 | Stop reason: HOSPADM

## 2023-11-17 RX ORDER — SODIUM CHLORIDE 9 MG/ML
INJECTION, SOLUTION INTRAVENOUS PRN
Status: DISCONTINUED | OUTPATIENT
Start: 2023-11-17 | End: 2023-11-17 | Stop reason: HOSPADM

## 2023-11-17 RX ORDER — LABETALOL HYDROCHLORIDE 5 MG/ML
10 INJECTION, SOLUTION INTRAVENOUS
Status: DISCONTINUED | OUTPATIENT
Start: 2023-11-17 | End: 2023-11-17 | Stop reason: HOSPADM

## 2023-11-17 RX ORDER — MAGNESIUM SULFATE HEPTAHYDRATE 500 MG/ML
INJECTION, SOLUTION INTRAMUSCULAR; INTRAVENOUS PRN
Status: DISCONTINUED | OUTPATIENT
Start: 2023-11-17 | End: 2023-11-17 | Stop reason: SDUPTHER

## 2023-11-17 RX ORDER — HYDRALAZINE HYDROCHLORIDE 20 MG/ML
10 INJECTION INTRAMUSCULAR; INTRAVENOUS
Status: DISCONTINUED | OUTPATIENT
Start: 2023-11-17 | End: 2023-11-17 | Stop reason: HOSPADM

## 2023-11-17 RX ORDER — PROPOFOL 10 MG/ML
INJECTION, EMULSION INTRAVENOUS PRN
Status: DISCONTINUED | OUTPATIENT
Start: 2023-11-17 | End: 2023-11-17 | Stop reason: SDUPTHER

## 2023-11-17 RX ORDER — GLYCOPYRROLATE 0.2 MG/ML
INJECTION INTRAMUSCULAR; INTRAVENOUS PRN
Status: DISCONTINUED | OUTPATIENT
Start: 2023-11-17 | End: 2023-11-17 | Stop reason: SDUPTHER

## 2023-11-17 RX ORDER — MIDAZOLAM HYDROCHLORIDE 1 MG/ML
INJECTION INTRAMUSCULAR; INTRAVENOUS PRN
Status: DISCONTINUED | OUTPATIENT
Start: 2023-11-17 | End: 2023-11-17 | Stop reason: SDUPTHER

## 2023-11-17 RX ORDER — IBUPROFEN 800 MG/1
800 TABLET ORAL EVERY 6 HOURS PRN
Qty: 50 TABLET | Refills: 3 | Status: SHIPPED | OUTPATIENT
Start: 2023-11-17

## 2023-11-17 RX ORDER — KETAMINE HCL IN NACL, ISO-OSM 100MG/10ML
SYRINGE (ML) INJECTION PRN
Status: DISCONTINUED | OUTPATIENT
Start: 2023-11-17 | End: 2023-11-17 | Stop reason: SDUPTHER

## 2023-11-17 RX ORDER — LIDOCAINE HYDROCHLORIDE 20 MG/ML
INJECTION, SOLUTION EPIDURAL; INFILTRATION; INTRACAUDAL; PERINEURAL PRN
Status: DISCONTINUED | OUTPATIENT
Start: 2023-11-17 | End: 2023-11-17 | Stop reason: SDUPTHER

## 2023-11-17 RX ORDER — SODIUM CHLORIDE, SODIUM LACTATE, POTASSIUM CHLORIDE, CALCIUM CHLORIDE 600; 310; 30; 20 MG/100ML; MG/100ML; MG/100ML; MG/100ML
INJECTION, SOLUTION INTRAVENOUS CONTINUOUS
Status: DISCONTINUED | OUTPATIENT
Start: 2023-11-17 | End: 2023-11-17 | Stop reason: HOSPADM

## 2023-11-17 RX ORDER — ROCURONIUM BROMIDE 10 MG/ML
INJECTION, SOLUTION INTRAVENOUS PRN
Status: DISCONTINUED | OUTPATIENT
Start: 2023-11-17 | End: 2023-11-17 | Stop reason: SDUPTHER

## 2023-11-17 RX ADMIN — OXYCODONE 5 MG: 5 TABLET ORAL at 11:57

## 2023-11-17 RX ADMIN — OXYCODONE 5 MG: 5 TABLET ORAL at 09:36

## 2023-11-17 RX ADMIN — DEXAMETHASONE SODIUM PHOSPHATE 8 MG: 4 INJECTION, SOLUTION INTRAMUSCULAR; INTRAVENOUS at 07:46

## 2023-11-17 RX ADMIN — ONDANSETRON 4 MG: 2 INJECTION INTRAMUSCULAR; INTRAVENOUS at 08:30

## 2023-11-17 RX ADMIN — CEFAZOLIN 2000 MG: 2 INJECTION, POWDER, FOR SOLUTION INTRAMUSCULAR; INTRAVENOUS at 07:27

## 2023-11-17 RX ADMIN — Medication 100 MCG: at 08:30

## 2023-11-17 RX ADMIN — FENTANYL CITRATE 50 MCG: 50 INJECTION, SOLUTION INTRAMUSCULAR; INTRAVENOUS at 07:32

## 2023-11-17 RX ADMIN — ROCURONIUM BROMIDE 50 MG: 10 INJECTION, SOLUTION INTRAVENOUS at 07:33

## 2023-11-17 RX ADMIN — Medication 100 MCG: at 08:24

## 2023-11-17 RX ADMIN — Medication 50 MCG: at 07:49

## 2023-11-17 RX ADMIN — KETOROLAC TROMETHAMINE 15 MG: 30 INJECTION, SOLUTION INTRAMUSCULAR; INTRAVENOUS at 11:58

## 2023-11-17 RX ADMIN — LIDOCAINE HYDROCHLORIDE 100 MG: 20 INJECTION, SOLUTION EPIDURAL; INFILTRATION; INTRACAUDAL; PERINEURAL at 07:33

## 2023-11-17 RX ADMIN — Medication 100 MCG: at 08:12

## 2023-11-17 RX ADMIN — Medication 20 MG: at 07:40

## 2023-11-17 RX ADMIN — SODIUM CHLORIDE, POTASSIUM CHLORIDE, SODIUM LACTATE AND CALCIUM CHLORIDE: 600; 310; 30; 20 INJECTION, SOLUTION INTRAVENOUS at 06:50

## 2023-11-17 RX ADMIN — FENTANYL CITRATE 50 MCG: 50 INJECTION, SOLUTION INTRAMUSCULAR; INTRAVENOUS at 08:13

## 2023-11-17 RX ADMIN — GLYCOPYRROLATE 0.2 MG: 0.2 INJECTION INTRAMUSCULAR; INTRAVENOUS at 08:29

## 2023-11-17 RX ADMIN — HYDROMORPHONE HYDROCHLORIDE 0.5 MG: 2 INJECTION, SOLUTION INTRAMUSCULAR; INTRAVENOUS; SUBCUTANEOUS at 09:20

## 2023-11-17 RX ADMIN — Medication 50 MCG: at 07:43

## 2023-11-17 RX ADMIN — SUGAMMADEX 200 MG: 100 INJECTION, SOLUTION INTRAVENOUS at 08:33

## 2023-11-17 RX ADMIN — FENTANYL CITRATE 25 MCG: 50 INJECTION, SOLUTION INTRAMUSCULAR; INTRAVENOUS at 09:00

## 2023-11-17 RX ADMIN — MAGNESIUM SULFATE HEPTAHYDRATE 1 G: 500 INJECTION, SOLUTION INTRAMUSCULAR; INTRAVENOUS at 07:42

## 2023-11-17 RX ADMIN — FENTANYL CITRATE 25 MCG: 50 INJECTION, SOLUTION INTRAMUSCULAR; INTRAVENOUS at 09:07

## 2023-11-17 RX ADMIN — MIDAZOLAM 2 MG: 1 INJECTION INTRAMUSCULAR; INTRAVENOUS at 07:30

## 2023-11-17 RX ADMIN — Medication 50 MCG: at 07:46

## 2023-11-17 RX ADMIN — PROPOFOL 150 MG: 10 INJECTION, EMULSION INTRAVENOUS at 07:33

## 2023-11-17 RX ADMIN — HYDROMORPHONE HYDROCHLORIDE 0.5 MG: 2 INJECTION, SOLUTION INTRAMUSCULAR; INTRAVENOUS; SUBCUTANEOUS at 08:54

## 2023-11-17 ASSESSMENT — PAIN SCALES - GENERAL
PAINLEVEL_OUTOF10: 5
PAINLEVEL_OUTOF10: 2
PAINLEVEL_OUTOF10: 7
PAINLEVEL_OUTOF10: 6
PAINLEVEL_OUTOF10: 4
PAINLEVEL_OUTOF10: 4
PAINLEVEL_OUTOF10: 1
PAINLEVEL_OUTOF10: 5
PAINLEVEL_OUTOF10: 6
PAINLEVEL_OUTOF10: 3

## 2023-11-17 ASSESSMENT — PAIN DESCRIPTION - LOCATION
LOCATION: ABDOMEN

## 2023-11-17 ASSESSMENT — PAIN DESCRIPTION - PAIN TYPE
TYPE: SURGICAL PAIN
TYPE: SURGICAL PAIN

## 2023-11-17 ASSESSMENT — PAIN DESCRIPTION - DESCRIPTORS
DESCRIPTORS: CRAMPING

## 2023-11-17 ASSESSMENT — PAIN - FUNCTIONAL ASSESSMENT: PAIN_FUNCTIONAL_ASSESSMENT: 0-10

## 2023-11-17 ASSESSMENT — ENCOUNTER SYMPTOMS: SHORTNESS OF BREATH: 0

## 2023-11-17 NOTE — OP NOTE
Pre-operative Diagnosis: menorrhagia, failed medical therapy    Post-operative Diagnosis: same    Operation: total vaginal hysterectomy, modified Farr's cul de plasty    Surgeon: Mariam Hodges MD    Assistants: Chuyita King    Anesthesia: General endotracheal anesthesia    Procedure Details   The patient was seen in the Holding Room. The risks, benefits, complications, treatment options, and expected outcomes were discussed with the patient. The patient concurred with the proposed plan, giving informed consent. The site of surgery properly noted/marked. The patient was taken to Operating Room # 1, identified as Ginette Forbes and the procedure verified as Total vaginal hysterectomy. A Time Out was held and the above information confirmed. After induction of anesthesia, the patient was draped and prepped in the usual sterile manner. Pt was placed in dorsal lithotomy position after anesthesia and draped and prepped in the usual sterile manner. A Leahy catheter was used to drain her bladder. A heavy weighted speculum was inserted into vagina. Anterior lip of cervix was grasped with a tenaculum times two. Cervix was cut circumferentially. Posterior cul de sac was entered with sharp neil scissors and entered. Issa clamps were used to clamp across the uterosacral ligaments which were then transected and ligated with 0 vicryl on both sides. Anterior cul de sac was entered and the bladder was retracted out of the way. Serial continuous clamping, ligation and transectiong of the ligaments were performed with Ligasure until the uterus was removed. The pedicles were visualized and small amount of oozing from left pedicles were ligated with 0 vicryl until hemotasis was achieved. The adnexa could not be visualized and decision was made to leave the adnexa intact. Bupivicaine was injected into the bilateral uterosacral ligaments.   Modified Farr's culde plasty suture was then placed with 0 vicryl to prevent

## 2023-11-17 NOTE — PROGRESS NOTES
Pt to PACU from OR. Post op TOTAL VAGINAL HYSTERECTOMY . VSS. Pt arouses to voice. Leahy in place. NSR noted. Will continue to monitor.  Doctor Angelica Curry was at bedside

## 2023-11-17 NOTE — PROGRESS NOTES
Phase 1 complete, pt seen by anesthesiologist. VSS, pt resting comfortably. Leahy in place. Family updated. Pt eating crackers and drinking apple juice. Will transition to phase 2 for d/c. Plan was for admission but pt wants to go home and Doctor Monica Aguilar stated that was fine as long as pain is controlled.

## 2023-11-17 NOTE — PROGRESS NOTES
Pt transported via wheelchair to [facility]. Pt d/c'd home per private vehicle with a responsible adult who states they will be with them for the next 24 hours. Pt and family state verbal understanding of d/c orders. IV d/c'd with no complications. Pt given new peripad and mesh underwear. Peripad had scant bloody discharge when thrown away. Pt walked to the bathroom and was able to void approximately 50ML.

## 2023-11-17 NOTE — ANESTHESIA POSTPROCEDURE EVALUATION
Department of Anesthesiology  Postprocedure Note    Patient: Leslee Dalal  MRN: [id number]  YOB: 1980  Date of evaluation: 11/17/2023      Procedure Summary     Date: 11/17/23 Room / Location: [address]    Anesthesia Start: 0730 Anesthesia Stop: 9504    Procedure: TOTAL  VAGINAL HYSTERECTOMY (Vagina ) Diagnosis:       Menorrhagia with regular cycle      Genitourinary graft malfunction (HCC)      (Menorrhagia with regular cycle [N92.0])      (Genitourinary graft malfunction ([address]) Juanita Hernandezman)    Surgeons: Ambrosio Reyes MD Responsible Provider: Ambrosio Reyes MD    Anesthesia Type: General ASA Status: 2          Anesthesia Type: General    Nicole Phase I: Nicole Score: 10    Nicole Phase II: Nicole Score: 10      Anesthesia Post Evaluation    Patient location during evaluation: PACU  Patient participation: complete - patient participated  Level of consciousness: awake and alert  Airway patency: patent  Nausea & Vomiting: no nausea and no vomiting  Complications: no  Cardiovascular status: hemodynamically stable  Respiratory status: acceptable  Hydration status: stable  Multimodal analgesia pain management approach  Pain management: adequate

## 2023-11-20 ENCOUNTER — CARE COORDINATION (OUTPATIENT)
Dept: OTHER | Facility: CLINIC | Age: 43
End: 2023-11-20

## 2023-11-20 NOTE — CARE COORDINATION
.Care Transitions Initial Follow Up Call    Call within 2 business days of discharge: Yes    Patient Current Location: [address] [place] [address] Transition Nurse contacted the patient by telephone to perform post hospital discharge assessment. Verified name and  with patient as identifiers. Provided introduction to self, and explanation of the Care Transition Nurse role. Patient: Zeyad Armstrong Patient : 1980   MRN: [id number]  Reason for Admission: [address] [facility] hys 23  Discharge Date: 23 RARS: No data recorded    Last Discharge Facility       Date Complaint Diagnosis Description Type Department Provider    23  Post-op pain . .. Admission (Discharged) Maryann Fan MD     Was this an external facility discharge? No Discharge Facility: [facility]    Challenges to be reviewed by the provider   Additional needs identified to be addressed with provider: No  none               Method of communication with provider: none. S/w pt today she is doing well post     Care Transition Nurse reviewed discharge instructions, medical action plan, and red flags with patient who verbalized understanding. The patient was given an opportunity to ask questions and does not have any further questions or concerns at this time. Were discharge instructions available to patient? Yes. Reviewed appropriate site of care based on symptoms and resources available to patient including: Benefits related nurse triage line  When to call 911  Condition related references. The patient agrees to contact the PCP office for questions related to their healthcare. Advance Care Planning:   Does patient have an Advance Directive: reviewed and current. Medication reconciliation was performed with patient, who verbalizes understanding of administration of home medications. Medications reviewed, 1111F entered: yes    Was patient discharged with a pulse oximeter?   N/a    Non-face-to-face services provided:  Education of

## 2023-11-29 ENCOUNTER — CARE COORDINATION (OUTPATIENT)
Dept: OTHER | Facility: CLINIC | Age: 43
End: 2023-11-29

## 2023-11-29 NOTE — CARE COORDINATION
.Care Transitions Initial Follow Up Call     Call within 2 business days of discharge: Yes     Patient Current Location: [address] [place] [address] Transition Nurse contacted the patient by telephone to perform post hospital discharge assessment. Verified name and  with patient as identifiers. Provided introduction to self, and explanation of the Care Transition Nurse role. Patient: Alecia Jones      Patient : 1980   MRN: [id number]         Reason for Admission: [address] h[address] 23  Discharge Date: 23     RARS: No data recorded     Last Discharge Facility         Date Complaint Diagnosis Description Type Department Provider     23   Post-op pain . .. Admission (Discharged) Estrada Chavez MD      Was this an external facility discharge? No Discharge Facility: [facility]          Challenges to be reviewed by the provider    Additional needs identified to be addressed with provider: No  none                    Method of communication with provider: none. S/w pt today she is doing well post op. She has no concerns will probably be able to rtw next week but possible light duty     Care Transition Nurse reviewed discharge instructions, medical action plan, and red flags with patient who verbalized understanding. The patient was given an opportunity to ask questions and does not have any further questions or concerns at this time. Were discharge instructions available to patient? Yes. Reviewed appropriate site of care based on symptoms and resources available to patient including: Benefits related nurse triage line  When to call 911  Condition related references. The patient agrees to contact the PCP office for questions related to their healthcare. Advance Care Planning:   Does patient have an Advance Directive: reviewed and current. Medication reconciliation was performed with patient, who verbalizes understanding of administration of home medications.  Medications reviewed,

## 2024-03-29 RX ORDER — AMOXICILLIN 875 MG/1
875 TABLET, COATED ORAL 2 TIMES DAILY
Qty: 20 TABLET | Refills: 0 | Status: SHIPPED | OUTPATIENT
Start: 2024-03-29 | End: 2024-04-08

## 2024-04-05 RX ORDER — FLUCONAZOLE 150 MG/1
150 TABLET ORAL ONCE
Qty: 2 TABLET | Refills: 0 | Status: SHIPPED | OUTPATIENT
Start: 2024-04-05 | End: 2024-04-05

## 2024-04-09 DIAGNOSIS — E66.09 CLASS 1 OBESITY DUE TO EXCESS CALORIES WITHOUT SERIOUS COMORBIDITY WITH BODY MASS INDEX (BMI) OF 30.0 TO 30.9 IN ADULT: Primary | ICD-10-CM

## 2024-08-26 RX ORDER — FLUCONAZOLE 150 MG/1
150 TABLET ORAL ONCE
Qty: 2 TABLET | Refills: 0 | Status: SHIPPED | OUTPATIENT
Start: 2024-08-26 | End: 2024-08-26

## 2024-09-04 DIAGNOSIS — J30.2 SEASONAL ALLERGIES: ICD-10-CM

## 2024-09-04 RX ORDER — MONTELUKAST SODIUM 10 MG/1
10 TABLET ORAL DAILY
Qty: 30 TABLET | Refills: 2 | Status: SHIPPED | OUTPATIENT
Start: 2024-09-04

## 2024-09-04 RX ORDER — ESCITALOPRAM OXALATE 10 MG/1
10 TABLET ORAL DAILY
Qty: 30 TABLET | Refills: 2 | Status: SHIPPED | OUTPATIENT
Start: 2024-09-04

## 2024-12-08 DIAGNOSIS — J30.2 SEASONAL ALLERGIES: ICD-10-CM

## 2024-12-08 RX ORDER — ESCITALOPRAM OXALATE 10 MG/1
10 TABLET ORAL DAILY
Qty: 30 TABLET | Refills: 2 | Status: SHIPPED | OUTPATIENT
Start: 2024-12-08

## 2024-12-08 RX ORDER — MONTELUKAST SODIUM 10 MG/1
10 TABLET ORAL DAILY
Qty: 30 TABLET | Refills: 2 | Status: SHIPPED | OUTPATIENT
Start: 2024-12-08

## 2025-01-23 ENCOUNTER — OFFICE VISIT (OUTPATIENT)
Dept: FAMILY MEDICINE CLINIC | Age: 45
End: 2025-01-23

## 2025-01-23 VITALS
TEMPERATURE: 98.5 F | HEART RATE: 97 BPM | WEIGHT: 210 LBS | HEIGHT: 69 IN | SYSTOLIC BLOOD PRESSURE: 112 MMHG | OXYGEN SATURATION: 96 % | DIASTOLIC BLOOD PRESSURE: 80 MMHG | BODY MASS INDEX: 31.1 KG/M2

## 2025-01-23 DIAGNOSIS — F33.0 MILD EPISODE OF RECURRENT MAJOR DEPRESSIVE DISORDER (HCC): ICD-10-CM

## 2025-01-23 DIAGNOSIS — M76.821 POSTERIOR TIBIAL TENDON DYSFUNCTION (PTTD) OF RIGHT LOWER EXTREMITY: ICD-10-CM

## 2025-01-23 DIAGNOSIS — Z01.818 PREOP EXAMINATION: ICD-10-CM

## 2025-01-23 DIAGNOSIS — Z00.00 ROUTINE GENERAL MEDICAL EXAMINATION AT A HEALTH CARE FACILITY: ICD-10-CM

## 2025-01-23 DIAGNOSIS — Z00.00 ROUTINE GENERAL MEDICAL EXAMINATION AT A HEALTH CARE FACILITY: Primary | ICD-10-CM

## 2025-01-23 DIAGNOSIS — J45.20 MILD INTERMITTENT ASTHMA WITHOUT COMPLICATION: ICD-10-CM

## 2025-01-23 LAB
ALBUMIN SERPL-MCNC: 4.3 G/DL (ref 3.4–5)
ALBUMIN/GLOB SERPL: 1.9 {RATIO} (ref 1.1–2.2)
ALP SERPL-CCNC: 67 U/L (ref 40–129)
ALT SERPL-CCNC: 19 U/L (ref 10–40)
ANION GAP SERPL CALCULATED.3IONS-SCNC: 8 MMOL/L (ref 3–16)
AST SERPL-CCNC: 18 U/L (ref 15–37)
BASOPHILS # BLD: 0 K/UL (ref 0–0.2)
BASOPHILS NFR BLD: 0.8 %
BILIRUB SERPL-MCNC: 0.3 MG/DL (ref 0–1)
BUN SERPL-MCNC: 15 MG/DL (ref 7–20)
CALCIUM SERPL-MCNC: 9.4 MG/DL (ref 8.3–10.6)
CHLORIDE SERPL-SCNC: 104 MMOL/L (ref 99–110)
CO2 SERPL-SCNC: 27 MMOL/L (ref 21–32)
CREAT SERPL-MCNC: 0.8 MG/DL (ref 0.6–1.1)
DEPRECATED RDW RBC AUTO: 12.7 % (ref 12.4–15.4)
EOSINOPHIL # BLD: 0.1 K/UL (ref 0–0.6)
EOSINOPHIL NFR BLD: 2.2 %
GFR SERPLBLD CREATININE-BSD FMLA CKD-EPI: >90 ML/MIN/{1.73_M2}
GLUCOSE SERPL-MCNC: 92 MG/DL (ref 70–99)
HCT VFR BLD AUTO: 42.3 % (ref 36–48)
HGB BLD-MCNC: 14.5 G/DL (ref 12–16)
LYMPHOCYTES # BLD: 1.8 K/UL (ref 1–5.1)
LYMPHOCYTES NFR BLD: 29.1 %
MCH RBC QN AUTO: 29.9 PG (ref 26–34)
MCHC RBC AUTO-ENTMCNC: 34.4 G/DL (ref 31–36)
MCV RBC AUTO: 86.9 FL (ref 80–100)
MONOCYTES # BLD: 0.5 K/UL (ref 0–1.3)
MONOCYTES NFR BLD: 7.9 %
NEUTROPHILS # BLD: 3.6 K/UL (ref 1.7–7.7)
NEUTROPHILS NFR BLD: 60 %
PLATELET # BLD AUTO: 240 K/UL (ref 135–450)
PMV BLD AUTO: 9.1 FL (ref 5–10.5)
POTASSIUM SERPL-SCNC: 4.3 MMOL/L (ref 3.5–5.1)
PROT SERPL-MCNC: 6.6 G/DL (ref 6.4–8.2)
RBC # BLD AUTO: 4.87 M/UL (ref 4–5.2)
SODIUM SERPL-SCNC: 139 MMOL/L (ref 136–145)
WBC # BLD AUTO: 6 K/UL (ref 4–11)

## 2025-01-23 RX ORDER — OXYCODONE AND ACETAMINOPHEN 5; 325 MG/1; MG/1
1 TABLET ORAL EVERY 8 HOURS PRN
COMMUNITY
Start: 2025-01-05

## 2025-01-23 SDOH — ECONOMIC STABILITY: FOOD INSECURITY: WITHIN THE PAST 12 MONTHS, YOU WORRIED THAT YOUR FOOD WOULD RUN OUT BEFORE YOU GOT MONEY TO BUY MORE.: NEVER TRUE

## 2025-01-23 SDOH — ECONOMIC STABILITY: FOOD INSECURITY: WITHIN THE PAST 12 MONTHS, THE FOOD YOU BOUGHT JUST DIDN'T LAST AND YOU DIDN'T HAVE MONEY TO GET MORE.: NEVER TRUE

## 2025-01-23 ASSESSMENT — ENCOUNTER SYMPTOMS
DIARRHEA: 0
BACK PAIN: 0
COLOR CHANGE: 0
SHORTNESS OF BREATH: 0
WHEEZING: 0
SINUS PRESSURE: 0
CONSTIPATION: 0
COUGH: 0
SINUS PAIN: 0
ABDOMINAL PAIN: 0

## 2025-01-23 ASSESSMENT — PATIENT HEALTH QUESTIONNAIRE - PHQ9
3. TROUBLE FALLING OR STAYING ASLEEP: NOT AT ALL
9. THOUGHTS THAT YOU WOULD BE BETTER OFF DEAD, OR OF HURTING YOURSELF: NOT AT ALL
DEPRESSION UNABLE TO ASSESS: FUNCTIONAL CAPACITY MOTIVATION LIMITS ACCURACY
6. FEELING BAD ABOUT YOURSELF - OR THAT YOU ARE A FAILURE OR HAVE LET YOURSELF OR YOUR FAMILY DOWN: NOT AT ALL
2. FEELING DOWN, DEPRESSED OR HOPELESS: NOT AT ALL
10. IF YOU CHECKED OFF ANY PROBLEMS, HOW DIFFICULT HAVE THESE PROBLEMS MADE IT FOR YOU TO DO YOUR WORK, TAKE CARE OF THINGS AT HOME, OR GET ALONG WITH OTHER PEOPLE: NOT DIFFICULT AT ALL
SUM OF ALL RESPONSES TO PHQ9 QUESTIONS 1 & 2: 0
SUM OF ALL RESPONSES TO PHQ QUESTIONS 1-9: 0
4. FEELING TIRED OR HAVING LITTLE ENERGY: NOT AT ALL
8. MOVING OR SPEAKING SO SLOWLY THAT OTHER PEOPLE COULD HAVE NOTICED. OR THE OPPOSITE, BEING SO FIGETY OR RESTLESS THAT YOU HAVE BEEN MOVING AROUND A LOT MORE THAN USUAL: NOT AT ALL
7. TROUBLE CONCENTRATING ON THINGS, SUCH AS READING THE NEWSPAPER OR WATCHING TELEVISION: NOT AT ALL
1. LITTLE INTEREST OR PLEASURE IN DOING THINGS: NOT AT ALL
5. POOR APPETITE OR OVEREATING: NOT AT ALL

## 2025-01-23 NOTE — PROGRESS NOTES
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 [employer]    Happily  with 1 kid--parents in town    Hobbies-reads,art-exercise--yoga     Social Determinants of Health     Financial Resource Strain: Low Risk  (3/23/2023)    Overall Financial Resource Strain (CARDIA)     Difficulty of Paying Living Expenses: Not hard at all   Food Insecurity: No Food Insecurity (1/23/2025)    Hunger Vital Sign     Worried About Running Out of Food in the Last Year: Never true     Ran Out of Food in the Last Year: Never true   Transportation Needs: No Transportation Needs (1/23/2025)    PRAPARE - Transportation     Lack of Transportation (Medical): No     Lack of Transportation (Non-Medical): No   Physical Activity: Insufficiently Active (3/10/2023)    Exercise Vital Sign     Days of Exercise per Week: 2 days     Minutes of Exercise per Session: 30 min   Stress: Not on file   Social Connections: Not on file   Intimate Partner Violence: Unknown (4/12/2024)    Received from [facility]    Interpersonal Safety     Feel physically or emotionally unsafe where currently live: Not on file     Harm by anyone: Not on file     Emotionally Harmed: Not on file   Housing Stability: Low Risk  (1/23/2025)    Housing Stability Vital Sign     Unable to Pay for Housing in the Last Year: No     Number of Times Moved in the Last Year: 0     Homeless in the Last Year: No       Review of Systems  Review of Systems   Constitutional:  Negative for chills, fatigue and fever.   HENT:  Negative for congestion, sinus pressure and sinus pain.    Respiratory:  Negative for cough, shortness of breath and wheezing.    Cardiovascular:  Negative for chest pain and palpitations.   Gastrointestinal:  Negative for abdominal pain, constipation and diarrhea.   Musculoskeletal:  Positive for arthralgias. Negative for back

## 2025-01-23 NOTE — ASSESSMENT & PLAN NOTE
Perioperative risk related to the patient's upcoming surgery is considered low. she is cleared for surgery.  Pre-op exam was completed on 1/23/25 2:32 PM.    Check CBC, BMP

## 2025-02-22 PROBLEM — Z01.818 PREOP EXAMINATION: Status: RESOLVED | Noted: 2025-01-23 | Resolved: 2025-02-22

## 2025-03-06 DIAGNOSIS — J30.2 SEASONAL ALLERGIES: ICD-10-CM

## 2025-03-06 RX ORDER — ESCITALOPRAM OXALATE 10 MG/1
10 TABLET ORAL DAILY
Qty: 30 TABLET | Refills: 2 | Status: SHIPPED | OUTPATIENT
Start: 2025-03-06

## 2025-03-06 RX ORDER — MONTELUKAST SODIUM 10 MG/1
10 TABLET ORAL DAILY
Qty: 30 TABLET | Refills: 2 | Status: SHIPPED | OUTPATIENT
Start: 2025-03-06

## 2025-06-07 DIAGNOSIS — J30.2 SEASONAL ALLERGIES: ICD-10-CM

## 2025-06-07 RX ORDER — MONTELUKAST SODIUM 10 MG/1
10 TABLET ORAL DAILY
Qty: 30 TABLET | Refills: 2 | Status: SHIPPED | OUTPATIENT
Start: 2025-06-07

## 2025-06-07 RX ORDER — ESCITALOPRAM OXALATE 10 MG/1
10 TABLET ORAL DAILY
Qty: 30 TABLET | Refills: 2 | Status: SHIPPED | OUTPATIENT
Start: 2025-06-07

## 2025-07-18 NOTE — ASSESSMENT & PLAN NOTE
Chronic, at goal (stable), continue Lexapro   Farxiga application has been submitted to the [facility] Patient Assistance Program for consideration of eligibility.    application has been submitted to the [facility] Patient Assistance Program for consideration of eligibility.

## (undated) DEVICE — NEEDLE,22GX1.5",REG,BEVEL: Brand: MEDLINE

## (undated) DEVICE — CYSTO/BLADDER IRRIGATION SET, REGULATING CLAMP

## (undated) DEVICE — GAUZE,SPONGE,4"X4",8PLY,STRL,LF,10/TRAY: Brand: MEDLINE

## (undated) DEVICE — PROCEDURE SET INCLUDING PROCEDURE SHEATH, CASSETTE, DRAINAGE BAG: Brand: GENESYS HTA PROCERVA

## (undated) DEVICE — MERCY FAIRFIELD TURNOVER KIT: Brand: MEDLINE INDUSTRIES, INC.

## (undated) DEVICE — SPONGE LAP W18XL18IN WHT COT 4 PLY FLD STRUNG RADPQ DISP ST 2 PER PACK

## (undated) DEVICE — GLOVE SURG SZ 65 THK91MIL LTX FREE SYN POLYISOPRENE

## (undated) DEVICE — BLANKET WRM W29.9XL79.1IN UP BODY FORC AIR MISTRAL-AIR

## (undated) DEVICE — SOLUTION IRRIG 1000ML 0.9% SOD CHL USP POUR PLAS BTL

## (undated) DEVICE — LIGHT HANDLE: Brand: DEVON

## (undated) DEVICE — BLADE CLIPPER GEN PURP NS

## (undated) DEVICE — 3 ML SYRINGE LUER-LOCK TIP: Brand: MONOJECT

## (undated) DEVICE — SUTURE VCRL + SZ 0 L18IN ABSRB VLT L26MM CT-2 1/2 CIR VCP727D

## (undated) DEVICE — KIT NOVASURE V5 THERMOABLATION DEVICE

## (undated) DEVICE — SCRUB SURG BDINE FREE 4 OZ TECHNICARE

## (undated) DEVICE — BOWL MED L 32OZ PLAS W/ MOLD GRAD EZ OPN PEEL PCH

## (undated) DEVICE — VAG HYSTERECTOMY-LF: Brand: MEDLINE INDUSTRIES, INC.

## (undated) DEVICE — D & C-LF: Brand: MEDLINE INDUSTRIES, INC.

## (undated) DEVICE — BASIC SINGLE BASIN 1-LF: Brand: MEDLINE INDUSTRIES, INC.

## (undated) DEVICE — 1LYRTR 16FR10ML 100%SILI SNAP: Brand: MEDLINE INDUSTRIES, INC.

## (undated) DEVICE — INTENDED FOR TISSUE SEPARATION, AND OTHER PROCEDURES THAT REQUIRE A SHARP SURGICAL BLADE TO PUNCTURE OR CUT.: Brand: BARD-PARKER ® STAINLESS STEEL BLADES

## (undated) DEVICE — SEALER ENDOSCP NANO COAT OPN DIV CRV L JAW LIGASURE IMPACT

## (undated) DEVICE — SUTURE VCRL + SZ 0 L27IN ABSRB UD L36MM CT-1 1/2 CIR VCPP41D

## (undated) DEVICE — TUBING, SUCTION, 1/4" X 10', STRAIGHT: Brand: MEDLINE

## (undated) DEVICE — SYRINGE, LUER LOCK, 10ML: Brand: MEDLINE

## (undated) DEVICE — SOLUTION IRRIG 3000ML 0.9% SOD CHL USP UROMATIC PLAS CONT

## (undated) DEVICE — GOWN,AURORA,NONREINF,RAGLAN,XXL,STERILE: Brand: MEDLINE

## (undated) DEVICE — SNAPSECURE FOLEY DEVICE: Brand: MEDLINE

## (undated) DEVICE — DRAPE,REIN 53X77,STERILE: Brand: MEDLINE

## (undated) DEVICE — WET SKIN PREP TRAY: Brand: MEDLINE INDUSTRIES, INC.

## (undated) DEVICE — COVER,MAYO STAND,STERILE: Brand: MEDLINE

## (undated) DEVICE — CONTAINER,SPECIMEN,OR STERILE,4OZ: Brand: MEDLINE

## (undated) DEVICE — SUTURE VCRL + SZ 0 L18IN ABSRB CLR VCP112G

## (undated) DEVICE — SURE SET SINGLE BASIN-LF: Brand: MEDLINE INDUSTRIES, INC.

## (undated) DEVICE — CATHETER ETER URIN INTMIT NELATON RED RUB 16 FR

## (undated) DEVICE — SUTURE VCRL SZ 0 L36IN ABSRB UD CT-1 L36MM 1/2 CIR TAPR PNT VCP946H

## (undated) DEVICE — DECANTER FLD 9IN ST BG FOR ASEP TRNSF OF FLD

## (undated) DEVICE — PAD,ABDOMINAL,8"X10",ST,LF: Brand: MEDLINE